# Patient Record
Sex: MALE | Race: WHITE | NOT HISPANIC OR LATINO | Employment: FULL TIME | RURAL
[De-identification: names, ages, dates, MRNs, and addresses within clinical notes are randomized per-mention and may not be internally consistent; named-entity substitution may affect disease eponyms.]

---

## 2019-10-07 LAB — CRC RECOMMENDATION EXT: NORMAL

## 2021-01-21 ENCOUNTER — HISTORICAL (OUTPATIENT)
Dept: ADMINISTRATIVE | Facility: HOSPITAL | Age: 66
End: 2021-01-21

## 2021-04-26 RX ORDER — CHOLECALCIFEROL (VITAMIN D3) 25 MCG
1000 TABLET ORAL DAILY
COMMUNITY

## 2021-04-26 RX ORDER — L. ACIDOPHILUS/L.BULGARICUS 100MM CELL
1 GRANULES IN PACKET (EA) ORAL DAILY
COMMUNITY

## 2021-04-26 RX ORDER — FLUTICASONE FUROATE AND VILANTEROL TRIFENATATE 100; 25 UG/1; UG/1
POWDER RESPIRATORY (INHALATION)
COMMUNITY
Start: 2021-04-16 | End: 2022-07-13

## 2021-04-26 RX ORDER — METFORMIN HYDROCHLORIDE 500 MG/1
1000 TABLET ORAL 2 TIMES DAILY WITH MEALS
Qty: 360 TABLET | Refills: 1 | Status: SHIPPED | OUTPATIENT
Start: 2021-04-26 | End: 2021-09-02 | Stop reason: SDUPTHER

## 2021-04-26 RX ORDER — ATORVASTATIN CALCIUM 40 MG/1
40 TABLET, FILM COATED ORAL DAILY
COMMUNITY
Start: 2021-04-07 | End: 2021-08-04 | Stop reason: SDUPTHER

## 2021-04-26 RX ORDER — TADALAFIL 5 MG/1
5 TABLET ORAL DAILY PRN
COMMUNITY
End: 2021-10-27 | Stop reason: SDUPTHER

## 2021-04-26 RX ORDER — CLORAZEPATE DIPOTASSIUM 3.75 MG/1
2 TABLET ORAL EVERY 12 HOURS PRN
COMMUNITY
Start: 2021-03-22 | End: 2021-08-04 | Stop reason: SDUPTHER

## 2021-04-26 RX ORDER — VITAMIN B COMPLEX
1 CAPSULE ORAL DAILY
COMMUNITY

## 2021-04-26 RX ORDER — METFORMIN HYDROCHLORIDE 500 MG/1
1000 TABLET ORAL 2 TIMES DAILY WITH MEALS
COMMUNITY
End: 2021-04-26 | Stop reason: SDUPTHER

## 2021-04-26 RX ORDER — ZINC GLUCONATE 50 MG
50 TABLET ORAL DAILY
COMMUNITY
End: 2022-03-10

## 2021-04-26 RX ORDER — MELATONIN 10 MG
10 CAPSULE ORAL NIGHTLY
COMMUNITY

## 2021-04-26 RX ORDER — LOSARTAN POTASSIUM 25 MG/1
12.5 TABLET ORAL DAILY
COMMUNITY
Start: 2021-02-01 | End: 2022-07-13 | Stop reason: SDUPTHER

## 2021-04-29 ENCOUNTER — LAB VISIT (OUTPATIENT)
Dept: LAB | Facility: CLINIC | Age: 66
End: 2021-04-29
Payer: MEDICARE

## 2021-04-29 ENCOUNTER — OFFICE VISIT (OUTPATIENT)
Dept: FAMILY MEDICINE | Facility: CLINIC | Age: 66
End: 2021-04-29
Payer: MEDICARE

## 2021-04-29 VITALS
DIASTOLIC BLOOD PRESSURE: 74 MMHG | HEIGHT: 69 IN | BODY MASS INDEX: 33.57 KG/M2 | WEIGHT: 226.63 LBS | SYSTOLIC BLOOD PRESSURE: 118 MMHG | RESPIRATION RATE: 20 BRPM | HEART RATE: 79 BPM | OXYGEN SATURATION: 97 % | TEMPERATURE: 98 F

## 2021-04-29 DIAGNOSIS — F41.9 ANXIETY: ICD-10-CM

## 2021-04-29 DIAGNOSIS — Z11.59 SCREENING FOR VIRAL DISEASE: ICD-10-CM

## 2021-04-29 DIAGNOSIS — Z86.16 PERSONAL HISTORY OF COVID-19: ICD-10-CM

## 2021-04-29 DIAGNOSIS — E11.9 TYPE 2 DIABETES MELLITUS WITHOUT COMPLICATION, WITHOUT LONG-TERM CURRENT USE OF INSULIN: ICD-10-CM

## 2021-04-29 DIAGNOSIS — E78.2 MIXED HYPERLIPIDEMIA: ICD-10-CM

## 2021-04-29 DIAGNOSIS — K42.9 UMBILICAL HERNIA WITHOUT OBSTRUCTION AND WITHOUT GANGRENE: ICD-10-CM

## 2021-04-29 DIAGNOSIS — Z79.899 ENCOUNTER FOR LONG-TERM (CURRENT) USE OF OTHER MEDICATIONS: ICD-10-CM

## 2021-04-29 DIAGNOSIS — K40.90 INGUINAL HERNIA, LEFT: ICD-10-CM

## 2021-04-29 DIAGNOSIS — G47.33 OSA ON CPAP: ICD-10-CM

## 2021-04-29 DIAGNOSIS — E11.9 TYPE 2 DIABETES MELLITUS WITHOUT COMPLICATION, WITHOUT LONG-TERM CURRENT USE OF INSULIN: Primary | ICD-10-CM

## 2021-04-29 PROBLEM — I10 ESSENTIAL HYPERTENSION: Status: ACTIVE | Noted: 2021-04-29

## 2021-04-29 LAB
ALBUMIN SERPL BCP-MCNC: 4 G/DL (ref 3.5–5)
ALBUMIN/GLOB SERPL: 1.3 {RATIO}
ALP SERPL-CCNC: 88 U/L (ref 45–115)
ALT SERPL W P-5'-P-CCNC: 34 U/L (ref 16–61)
ANION GAP SERPL CALCULATED.3IONS-SCNC: 12 MMOL/L (ref 7–16)
AST SERPL W P-5'-P-CCNC: 13 U/L (ref 15–37)
BASOPHILS # BLD AUTO: 0.02 K/UL (ref 0–0.2)
BASOPHILS NFR BLD AUTO: 0.3 % (ref 0–1)
BILIRUB SERPL-MCNC: 0.3 MG/DL (ref 0–1.2)
BUN SERPL-MCNC: 21 MG/DL (ref 7–18)
BUN/CREAT SERPL: 20 (ref 6–20)
CALCIUM SERPL-MCNC: 9.5 MG/DL (ref 8.5–10.1)
CHLORIDE SERPL-SCNC: 106 MMOL/L (ref 98–107)
CO2 SERPL-SCNC: 29 MMOL/L (ref 21–32)
CREAT SERPL-MCNC: 1.04 MG/DL (ref 0.7–1.3)
DIFFERENTIAL METHOD BLD: ABNORMAL
EOSINOPHIL # BLD AUTO: 0.08 K/UL (ref 0–0.5)
EOSINOPHIL NFR BLD AUTO: 1 % (ref 1–4)
ERYTHROCYTE [DISTWIDTH] IN BLOOD BY AUTOMATED COUNT: 13.2 % (ref 11.5–14.5)
EST. AVERAGE GLUCOSE BLD GHB EST-MCNC: 114 MG/DL
GLOBULIN SER-MCNC: 3.1 G/DL (ref 2–4)
GLUCOSE SERPL-MCNC: 112 MG/DL (ref 74–106)
HBA1C MFR BLD HPLC: 6 % (ref 4.5–6.6)
HCT VFR BLD AUTO: 45.5 % (ref 40–54)
HCV AB SER QL: NORMAL
HGB BLD-MCNC: 14.6 G/DL (ref 13.5–18)
IMM GRANULOCYTES # BLD AUTO: 0.02 K/UL (ref 0–0.04)
IMM GRANULOCYTES NFR BLD: 0.3 % (ref 0–0.4)
LYMPHOCYTES # BLD AUTO: 1.81 K/UL (ref 1–4.8)
LYMPHOCYTES NFR BLD AUTO: 23.5 % (ref 27–41)
MCH RBC QN AUTO: 29.9 PG (ref 27–31)
MCHC RBC AUTO-ENTMCNC: 32.1 G/DL (ref 32–36)
MCV RBC AUTO: 93 FL (ref 80–96)
MONOCYTES # BLD AUTO: 0.58 K/UL (ref 0–0.8)
MONOCYTES NFR BLD AUTO: 7.5 % (ref 2–6)
MPC BLD CALC-MCNC: 10.3 FL (ref 9.4–12.4)
NEUTROPHILS # BLD AUTO: 5.2 K/UL (ref 1.8–7.7)
NEUTROPHILS NFR BLD AUTO: 67.4 % (ref 53–65)
NRBC # BLD AUTO: 0 X10E3/UL
NRBC, AUTO (.00): 0 %
PLATELET # BLD AUTO: 263 K/UL (ref 150–400)
POTASSIUM SERPL-SCNC: 4.1 MMOL/L (ref 3.5–5.1)
PROT SERPL-MCNC: 7.1 G/DL (ref 6.4–8.2)
RBC # BLD AUTO: 4.89 M/UL (ref 4.6–6.2)
SODIUM SERPL-SCNC: 143 MMOL/L (ref 136–145)
TSH SERPL DL<=0.005 MIU/L-ACNC: 2.28 UIU/ML (ref 0.36–3.74)
WBC # BLD AUTO: 7.71 K/UL (ref 4.5–11)

## 2021-04-29 PROCEDURE — 99214 OFFICE O/P EST MOD 30 MIN: CPT | Mod: ,,, | Performed by: NURSE PRACTITIONER

## 2021-04-29 PROCEDURE — 99214 PR OFFICE/OUTPT VISIT, EST, LEVL IV, 30-39 MIN: ICD-10-PCS | Mod: ,,, | Performed by: NURSE PRACTITIONER

## 2021-04-29 PROCEDURE — 85025 COMPLETE CBC W/AUTO DIFF WBC: CPT | Mod: ,,, | Performed by: CLINICAL MEDICAL LABORATORY

## 2021-04-29 PROCEDURE — 84443 ASSAY THYROID STIM HORMONE: CPT | Mod: ,,, | Performed by: CLINICAL MEDICAL LABORATORY

## 2021-04-29 PROCEDURE — 86769 ANTIBODY, SARS-COV-2: ICD-10-PCS | Mod: 90,CR,, | Performed by: CLINICAL MEDICAL LABORATORY

## 2021-04-29 PROCEDURE — 84443 TSH: ICD-10-PCS | Mod: ,,, | Performed by: CLINICAL MEDICAL LABORATORY

## 2021-04-29 PROCEDURE — 85025 CBC WITH DIFFERENTIAL: ICD-10-PCS | Mod: ,,, | Performed by: CLINICAL MEDICAL LABORATORY

## 2021-04-29 PROCEDURE — 36415 COLL VENOUS BLD VENIPUNCTURE: CPT

## 2021-04-29 PROCEDURE — 80053 COMPREHENSIVE METABOLIC PANEL: ICD-10-PCS | Mod: ,,, | Performed by: CLINICAL MEDICAL LABORATORY

## 2021-04-29 PROCEDURE — 86803 HEPATITIS C ANTIBODY: ICD-10-PCS | Mod: ,,, | Performed by: CLINICAL MEDICAL LABORATORY

## 2021-04-29 PROCEDURE — 86803 HEPATITIS C AB TEST: CPT | Mod: ,,, | Performed by: CLINICAL MEDICAL LABORATORY

## 2021-04-29 PROCEDURE — 80053 COMPREHEN METABOLIC PANEL: CPT | Mod: ,,, | Performed by: CLINICAL MEDICAL LABORATORY

## 2021-04-29 PROCEDURE — 83036 HEMOGLOBIN GLYCOSYLATED A1C: CPT | Mod: ,,, | Performed by: CLINICAL MEDICAL LABORATORY

## 2021-04-29 PROCEDURE — 83036 HEMOGLOBIN A1C: ICD-10-PCS | Mod: ,,, | Performed by: CLINICAL MEDICAL LABORATORY

## 2021-04-29 PROCEDURE — 86769 SARS-COV-2 COVID-19 ANTIBODY: CPT | Mod: 90,CR,, | Performed by: CLINICAL MEDICAL LABORATORY

## 2021-04-29 RX ORDER — CETIRIZINE HYDROCHLORIDE 10 MG/1
10 TABLET ORAL DAILY
COMMUNITY

## 2021-04-29 RX ORDER — ASPIRIN 81 MG/1
81 TABLET ORAL DAILY
COMMUNITY

## 2021-04-29 RX ORDER — FAMOTIDINE 10 MG/1
10 TABLET ORAL DAILY
COMMUNITY

## 2021-04-29 RX ORDER — DIPHENHYDRAMINE HCL 25 MG
25 CAPSULE ORAL NIGHTLY PRN
COMMUNITY

## 2021-05-02 LAB — MAYO GENERIC ORDERABLE RESULT: ABNORMAL

## 2021-05-11 ENCOUNTER — TELEPHONE (OUTPATIENT)
Dept: FAMILY MEDICINE | Facility: CLINIC | Age: 66
End: 2021-05-11

## 2021-08-04 RX ORDER — ATORVASTATIN CALCIUM 40 MG/1
40 TABLET, FILM COATED ORAL DAILY
Qty: 90 TABLET | Refills: 3 | Status: SHIPPED | OUTPATIENT
Start: 2021-08-04 | End: 2022-07-13 | Stop reason: SDUPTHER

## 2021-08-04 RX ORDER — OXYCODONE AND ACETAMINOPHEN 7.5; 325 MG/1; MG/1
TABLET ORAL
COMMUNITY
Start: 2021-06-15 | End: 2021-10-27

## 2021-08-04 RX ORDER — CLORAZEPATE DIPOTASSIUM 3.75 MG/1
7.5 TABLET ORAL EVERY 12 HOURS PRN
Qty: 90 TABLET | Refills: 2 | Status: SHIPPED | OUTPATIENT
Start: 2021-08-04 | End: 2021-11-04 | Stop reason: SDUPTHER

## 2021-09-02 RX ORDER — METFORMIN HYDROCHLORIDE 500 MG/1
1000 TABLET ORAL 2 TIMES DAILY WITH MEALS
Qty: 360 TABLET | Refills: 1 | Status: SHIPPED | OUTPATIENT
Start: 2021-09-02 | End: 2022-03-07 | Stop reason: SDUPTHER

## 2021-10-26 PROBLEM — D17.9 LIPOMA: Status: ACTIVE | Noted: 2021-10-26

## 2021-10-27 ENCOUNTER — OFFICE VISIT (OUTPATIENT)
Dept: FAMILY MEDICINE | Facility: CLINIC | Age: 66
End: 2021-10-27
Payer: MEDICARE

## 2021-10-27 VITALS
OXYGEN SATURATION: 96 % | HEART RATE: 67 BPM | BODY MASS INDEX: 33.97 KG/M2 | RESPIRATION RATE: 20 BRPM | HEIGHT: 69 IN | WEIGHT: 229.38 LBS | DIASTOLIC BLOOD PRESSURE: 62 MMHG | TEMPERATURE: 98 F | SYSTOLIC BLOOD PRESSURE: 118 MMHG

## 2021-10-27 DIAGNOSIS — F41.9 ANXIETY: ICD-10-CM

## 2021-10-27 DIAGNOSIS — R22.42 LOCALIZED SWELLING, MASS, OR LUMP OF LEFT LOWER EXTREMITY: Primary | ICD-10-CM

## 2021-10-27 DIAGNOSIS — Z12.5 PROSTATE CANCER SCREENING: ICD-10-CM

## 2021-10-27 DIAGNOSIS — Z79.899 ENCOUNTER FOR LONG-TERM (CURRENT) USE OF OTHER MEDICATIONS: ICD-10-CM

## 2021-10-27 DIAGNOSIS — E78.2 MIXED HYPERLIPIDEMIA: ICD-10-CM

## 2021-10-27 DIAGNOSIS — Z86.16 PERSONAL HISTORY OF COVID-19: ICD-10-CM

## 2021-10-27 DIAGNOSIS — E11.9 TYPE 2 DIABETES MELLITUS WITHOUT COMPLICATION, WITHOUT LONG-TERM CURRENT USE OF INSULIN: ICD-10-CM

## 2021-10-27 PROBLEM — R22.40 LOCALIZED SWELLING, MASS AND LUMP, LOWER LIMB: Status: ACTIVE | Noted: 2021-10-27

## 2021-10-27 PROBLEM — D17.9 LIPOMA: Status: RESOLVED | Noted: 2021-10-26 | Resolved: 2021-10-27

## 2021-10-27 PROBLEM — K42.9 UMBILICAL HERNIA WITHOUT OBSTRUCTION AND WITHOUT GANGRENE: Status: RESOLVED | Noted: 2021-04-29 | Resolved: 2021-10-27

## 2021-10-27 LAB
ALBUMIN SERPL BCP-MCNC: 3.7 G/DL (ref 3.5–5)
ALBUMIN/GLOB SERPL: 1.1 {RATIO}
ALP SERPL-CCNC: 91 U/L (ref 45–115)
ALT SERPL W P-5'-P-CCNC: 40 U/L (ref 16–61)
AMPHET UR QL SCN: NEGATIVE
ANION GAP SERPL CALCULATED.3IONS-SCNC: 11 MMOL/L (ref 7–16)
AST SERPL W P-5'-P-CCNC: 19 U/L (ref 15–37)
BARBITURATES UR QL SCN: NEGATIVE
BASOPHILS # BLD AUTO: 0.02 K/UL (ref 0–0.2)
BASOPHILS NFR BLD AUTO: 0.4 % (ref 0–1)
BENZODIAZ METAB UR QL SCN: POSITIVE
BILIRUB SERPL-MCNC: 0.4 MG/DL (ref 0–1.2)
BUN SERPL-MCNC: 18 MG/DL (ref 7–18)
BUN/CREAT SERPL: 15 (ref 6–20)
CALCIUM SERPL-MCNC: 9.2 MG/DL (ref 8.5–10.1)
CANNABINOIDS UR QL SCN: NEGATIVE
CHLORIDE SERPL-SCNC: 107 MMOL/L (ref 98–107)
CHOLEST SERPL-MCNC: 102 MG/DL (ref 0–200)
CHOLEST/HDLC SERPL: 2.8 {RATIO}
CO2 SERPL-SCNC: 27 MMOL/L (ref 21–32)
COCAINE UR QL SCN: NEGATIVE
CREAT SERPL-MCNC: 1.19 MG/DL (ref 0.7–1.3)
DIFFERENTIAL METHOD BLD: ABNORMAL
EOSINOPHIL # BLD AUTO: 0.11 K/UL (ref 0–0.5)
EOSINOPHIL NFR BLD AUTO: 2 % (ref 1–4)
ERYTHROCYTE [DISTWIDTH] IN BLOOD BY AUTOMATED COUNT: 13.5 % (ref 11.5–14.5)
EST. AVERAGE GLUCOSE BLD GHB EST-MCNC: 120 MG/DL
GLOBULIN SER-MCNC: 3.4 G/DL (ref 2–4)
GLUCOSE SERPL-MCNC: 116 MG/DL (ref 74–106)
HBA1C MFR BLD HPLC: 6.2 % (ref 4.5–6.6)
HCT VFR BLD AUTO: 43.5 % (ref 40–54)
HDLC SERPL-MCNC: 36 MG/DL (ref 40–60)
HGB BLD-MCNC: 14.2 G/DL (ref 13.5–18)
IMM GRANULOCYTES # BLD AUTO: 0.01 K/UL (ref 0–0.04)
IMM GRANULOCYTES NFR BLD: 0.2 % (ref 0–0.4)
LDLC SERPL CALC-MCNC: 44 MG/DL
LDLC/HDLC SERPL: 1.2 {RATIO}
LYMPHOCYTES # BLD AUTO: 1.49 K/UL (ref 1–4.8)
LYMPHOCYTES NFR BLD AUTO: 26.5 % (ref 27–41)
MCH RBC QN AUTO: 30.3 PG (ref 27–31)
MCHC RBC AUTO-ENTMCNC: 32.6 G/DL (ref 32–36)
MCV RBC AUTO: 92.8 FL (ref 80–96)
MONOCYTES # BLD AUTO: 0.46 K/UL (ref 0–0.8)
MONOCYTES NFR BLD AUTO: 8.2 % (ref 2–6)
MPC BLD CALC-MCNC: 10.5 FL (ref 9.4–12.4)
NEUTROPHILS # BLD AUTO: 3.53 K/UL (ref 1.8–7.7)
NEUTROPHILS NFR BLD AUTO: 62.7 % (ref 53–65)
NONHDLC SERPL-MCNC: 66 MG/DL
NRBC # BLD AUTO: 0 X10E3/UL
NRBC, AUTO (.00): 0 %
OPIATES UR QL SCN: NEGATIVE
PCP UR QL SCN: NEGATIVE
PLATELET # BLD AUTO: 213 K/UL (ref 150–400)
POTASSIUM SERPL-SCNC: 3.8 MMOL/L (ref 3.5–5.1)
PROT SERPL-MCNC: 7.1 G/DL (ref 6.4–8.2)
PSA SERPL-MCNC: 0.74 NG/ML (ref 0–4.1)
RBC # BLD AUTO: 4.69 M/UL (ref 4.6–6.2)
SODIUM SERPL-SCNC: 141 MMOL/L (ref 136–145)
TRIGL SERPL-MCNC: 110 MG/DL (ref 35–150)
VLDLC SERPL-MCNC: 22 MG/DL
WBC # BLD AUTO: 5.62 K/UL (ref 4.5–11)

## 2021-10-27 PROCEDURE — 80307 DRUG SCREEN, URINE (BEAKER): ICD-10-PCS | Mod: ,,, | Performed by: CLINICAL MEDICAL LABORATORY

## 2021-10-27 PROCEDURE — G0103 PSA SCREENING: HCPCS | Mod: ,,, | Performed by: CLINICAL MEDICAL LABORATORY

## 2021-10-27 PROCEDURE — 85025 COMPLETE CBC W/AUTO DIFF WBC: CPT | Mod: ,,, | Performed by: CLINICAL MEDICAL LABORATORY

## 2021-10-27 PROCEDURE — 80307 DRUG TEST PRSMV CHEM ANLYZR: CPT | Mod: ,,, | Performed by: CLINICAL MEDICAL LABORATORY

## 2021-10-27 PROCEDURE — 85025 CBC WITH DIFFERENTIAL: ICD-10-PCS | Mod: ,,, | Performed by: CLINICAL MEDICAL LABORATORY

## 2021-10-27 PROCEDURE — 80061 LIPID PANEL: CPT | Mod: ,,, | Performed by: CLINICAL MEDICAL LABORATORY

## 2021-10-27 PROCEDURE — 80053 COMPREHEN METABOLIC PANEL: CPT | Mod: ,,, | Performed by: CLINICAL MEDICAL LABORATORY

## 2021-10-27 PROCEDURE — 80053 COMPREHENSIVE METABOLIC PANEL: ICD-10-PCS | Mod: ,,, | Performed by: CLINICAL MEDICAL LABORATORY

## 2021-10-27 PROCEDURE — 80061 LIPID PANEL: ICD-10-PCS | Mod: ,,, | Performed by: CLINICAL MEDICAL LABORATORY

## 2021-10-27 PROCEDURE — 99213 OFFICE O/P EST LOW 20 MIN: CPT | Mod: ,,, | Performed by: NURSE PRACTITIONER

## 2021-10-27 PROCEDURE — G0103 PSA, SCREENING: ICD-10-PCS | Mod: ,,, | Performed by: CLINICAL MEDICAL LABORATORY

## 2021-10-27 PROCEDURE — 99213 PR OFFICE/OUTPT VISIT, EST, LEVL III, 20-29 MIN: ICD-10-PCS | Mod: ,,, | Performed by: NURSE PRACTITIONER

## 2021-10-27 PROCEDURE — 83036 HEMOGLOBIN A1C: ICD-10-PCS | Mod: ,,, | Performed by: CLINICAL MEDICAL LABORATORY

## 2021-10-27 PROCEDURE — 83036 HEMOGLOBIN GLYCOSYLATED A1C: CPT | Mod: ,,, | Performed by: CLINICAL MEDICAL LABORATORY

## 2021-10-27 RX ORDER — TADALAFIL 5 MG/1
5 TABLET ORAL DAILY
Qty: 90 TABLET | Refills: 3 | Status: SHIPPED | OUTPATIENT
Start: 2021-10-27 | End: 2022-03-21 | Stop reason: SDUPTHER

## 2021-11-04 DIAGNOSIS — F41.9 ANXIETY: Primary | ICD-10-CM

## 2021-11-04 RX ORDER — CLORAZEPATE DIPOTASSIUM 3.75 MG/1
7.5 TABLET ORAL EVERY 12 HOURS PRN
Qty: 90 TABLET | Refills: 2 | Status: SHIPPED | OUTPATIENT
Start: 2021-11-04 | End: 2022-02-10 | Stop reason: SDUPTHER

## 2021-11-16 ENCOUNTER — TELEPHONE (OUTPATIENT)
Dept: FAMILY MEDICINE | Facility: CLINIC | Age: 66
End: 2021-11-16
Payer: MEDICARE

## 2021-11-17 ENCOUNTER — CLINICAL SUPPORT (OUTPATIENT)
Dept: FAMILY MEDICINE | Facility: CLINIC | Age: 66
End: 2021-11-17
Payer: MEDICARE

## 2021-11-17 DIAGNOSIS — Z23 NEED FOR INFLUENZA VACCINATION: Primary | ICD-10-CM

## 2021-11-17 PROCEDURE — G0008 ADMIN INFLUENZA VIRUS VAC: HCPCS | Mod: ,,, | Performed by: NURSE PRACTITIONER

## 2021-11-17 PROCEDURE — 90662 IIV NO PRSV INCREASED AG IM: CPT | Mod: ,,, | Performed by: NURSE PRACTITIONER

## 2021-11-17 PROCEDURE — 90662 FLU VACCINE - QUADRIVALENT - HIGH DOSE (65+) PRESERVATIVE FREE IM: ICD-10-PCS | Mod: ,,, | Performed by: NURSE PRACTITIONER

## 2021-11-17 PROCEDURE — G0008 FLU VACCINE - QUADRIVALENT - HIGH DOSE (65+) PRESERVATIVE FREE IM: ICD-10-PCS | Mod: ,,, | Performed by: NURSE PRACTITIONER

## 2022-02-10 DIAGNOSIS — F41.9 ANXIETY: ICD-10-CM

## 2022-02-10 RX ORDER — CLORAZEPATE DIPOTASSIUM 3.75 MG/1
7.5 TABLET ORAL EVERY 12 HOURS PRN
Qty: 90 TABLET | Refills: 0 | Status: SHIPPED | OUTPATIENT
Start: 2022-02-10 | End: 2022-03-10 | Stop reason: SDUPTHER

## 2022-02-10 NOTE — TELEPHONE ENCOUNTER
----- Message from Ariela Boone sent at 2/10/2022  8:37 AM CST -----  Patient needs a refill on tranzine called into [place] [id number] pharmacy.  Please call patient at [phone] if you have any questions. Thanks!

## 2022-03-07 RX ORDER — METFORMIN HYDROCHLORIDE 500 MG/1
1000 TABLET ORAL 2 TIMES DAILY WITH MEALS
Qty: 360 TABLET | Refills: 1 | Status: SHIPPED | OUTPATIENT
Start: 2022-03-07 | End: 2022-07-13 | Stop reason: SDUPTHER

## 2022-03-07 NOTE — TELEPHONE ENCOUNTER
----- Message from Ariela Boone sent at 3/7/2022 10:17 AM CST -----  Patient needs a refill on metformin [facility] .  Please call patient at [phone] if you have any questions. Thanks!

## 2022-03-10 ENCOUNTER — OFFICE VISIT (OUTPATIENT)
Dept: FAMILY MEDICINE | Facility: CLINIC | Age: 67
End: 2022-03-10
Payer: MEDICARE

## 2022-03-10 VITALS
WEIGHT: 234 LBS | DIASTOLIC BLOOD PRESSURE: 72 MMHG | RESPIRATION RATE: 20 BRPM | OXYGEN SATURATION: 97 % | SYSTOLIC BLOOD PRESSURE: 114 MMHG | HEIGHT: 69 IN | TEMPERATURE: 97 F | BODY MASS INDEX: 34.66 KG/M2 | HEART RATE: 71 BPM

## 2022-03-10 DIAGNOSIS — Z79.899 ENCOUNTER FOR LONG-TERM (CURRENT) USE OF OTHER MEDICATIONS: Primary | ICD-10-CM

## 2022-03-10 DIAGNOSIS — F41.9 ANXIETY: ICD-10-CM

## 2022-03-10 LAB
AMPHET UR QL SCN: NEGATIVE
BARBITURATES UR QL SCN: NEGATIVE
BENZODIAZ METAB UR QL SCN: POSITIVE
CANNABINOIDS UR QL SCN: NEGATIVE
COCAINE UR QL SCN: NEGATIVE
OPIATES UR QL SCN: NEGATIVE
PCP UR QL SCN: NEGATIVE

## 2022-03-10 PROCEDURE — 99213 PR OFFICE/OUTPT VISIT, EST, LEVL III, 20-29 MIN: ICD-10-PCS | Mod: ,,, | Performed by: NURSE PRACTITIONER

## 2022-03-10 PROCEDURE — 80307 DRUG TEST PRSMV CHEM ANLYZR: CPT | Mod: ,,, | Performed by: CLINICAL MEDICAL LABORATORY

## 2022-03-10 PROCEDURE — 99213 OFFICE O/P EST LOW 20 MIN: CPT | Mod: ,,, | Performed by: NURSE PRACTITIONER

## 2022-03-10 PROCEDURE — 80307 DRUG SCREEN, URINE (BEAKER): ICD-10-PCS | Mod: ,,, | Performed by: CLINICAL MEDICAL LABORATORY

## 2022-03-10 RX ORDER — CLORAZEPATE DIPOTASSIUM 3.75 MG/1
7.5 TABLET ORAL EVERY 12 HOURS PRN
Qty: 90 TABLET | Refills: 0 | Status: SHIPPED | OUTPATIENT
Start: 2022-03-10 | End: 2022-05-13 | Stop reason: SDUPTHER

## 2022-03-10 NOTE — PROGRESS NOTES
Subjective:       Patient ID: Jesse Winston is a 66 y.o. male.    Chief Complaint: Follow-up and Anxiety (Pt presents for 6 mth follow up for med refills.)    Pt presents for med refills. Pt is unable to see his PCP today.     Review of Systems   Constitutional: Negative for activity change, appetite change, fatigue and fever.   HENT: Negative for nasal congestion, nosebleeds, postnasal drip, rhinorrhea, sinus pressure/congestion, sneezing and sore throat.    Eyes: Negative for pain and itching.   Respiratory: Negative for cough, chest tightness, shortness of breath, wheezing and stridor.    Cardiovascular: Negative for chest pain.   Gastrointestinal: Negative for abdominal pain.   Genitourinary: Negative for dysuria.   Musculoskeletal: Negative for back pain.   Neurological: Negative for dizziness and headaches.   Psychiatric/Behavioral: Negative for behavioral problems and confusion.         Objective:      Physical Exam  Vitals and nursing note reviewed.   Constitutional:       Appearance: Normal appearance.   Cardiovascular:      Rate and Rhythm: Normal rate and regular rhythm.      Heart sounds: Normal heart sounds.   Pulmonary:      Effort: Pulmonary effort is normal.      Breath sounds: Normal breath sounds.   Musculoskeletal:         General: Normal range of motion.   Neurological:      Mental Status: He is alert and oriented to person, place, and time.   Psychiatric:         Behavior: Behavior normal.         Assessment:       Problem List Items Addressed This Visit        Psychiatric    Anxiety    Relevant Medications    clorazepate (TRANXENE) 3.75 MG Tab      Other Visit Diagnoses     Encounter for long-term (current) use of other medications    -  Primary    Relevant Orders    Drug Screen, Urine          Plan:        reviewed and attached to chart. UA drug screen completed.

## 2022-03-11 DIAGNOSIS — Z71.89 COMPLEX CARE COORDINATION: ICD-10-CM

## 2022-03-21 RX ORDER — TADALAFIL 5 MG/1
5 TABLET ORAL DAILY
Qty: 90 TABLET | Refills: 3 | Status: SHIPPED | OUTPATIENT
Start: 2022-03-21 | End: 2023-01-31 | Stop reason: SDUPTHER

## 2022-03-21 NOTE — TELEPHONE ENCOUNTER
----- Message from Ariela Boone sent at 3/21/2022  9:03 AM CDT -----  Patient needs a refill on tadalafil [facility].  Please call patient at [phone] if you have any questions. Thanks!

## 2022-05-13 DIAGNOSIS — F41.9 ANXIETY: ICD-10-CM

## 2022-05-13 NOTE — TELEPHONE ENCOUNTER
----- Message from Val Villanueva sent at 5/13/2022  9:39 AM CDT -----  Pt needs refill on tran[facility]ene [facility] to [facility]. [phone]

## 2022-05-16 RX ORDER — CLORAZEPATE DIPOTASSIUM 3.75 MG/1
7.5 TABLET ORAL EVERY 12 HOURS PRN
Qty: 90 TABLET | Refills: 0 | Status: SHIPPED | OUTPATIENT
Start: 2022-05-16 | End: 2022-07-13 | Stop reason: SDUPTHER

## 2022-07-13 ENCOUNTER — OFFICE VISIT (OUTPATIENT)
Dept: FAMILY MEDICINE | Facility: CLINIC | Age: 67
End: 2022-07-13
Payer: MEDICARE

## 2022-07-13 VITALS
SYSTOLIC BLOOD PRESSURE: 122 MMHG | HEIGHT: 69 IN | HEART RATE: 84 BPM | OXYGEN SATURATION: 97 % | DIASTOLIC BLOOD PRESSURE: 84 MMHG | BODY MASS INDEX: 35.16 KG/M2 | RESPIRATION RATE: 20 BRPM | TEMPERATURE: 98 F | WEIGHT: 237.38 LBS

## 2022-07-13 DIAGNOSIS — Z79.899 ENCOUNTER FOR LONG-TERM (CURRENT) USE OF OTHER MEDICATIONS: ICD-10-CM

## 2022-07-13 DIAGNOSIS — F41.9 ANXIETY: Primary | Chronic | ICD-10-CM

## 2022-07-13 DIAGNOSIS — J45.20 MILD INTERMITTENT ASTHMA WITHOUT COMPLICATION: Chronic | ICD-10-CM

## 2022-07-13 DIAGNOSIS — E11.9 TYPE 2 DIABETES MELLITUS WITHOUT COMPLICATION, WITHOUT LONG-TERM CURRENT USE OF INSULIN: Chronic | ICD-10-CM

## 2022-07-13 DIAGNOSIS — E78.2 MIXED HYPERLIPIDEMIA: Chronic | ICD-10-CM

## 2022-07-13 PROBLEM — K40.90 INGUINAL HERNIA, LEFT: Status: RESOLVED | Noted: 2021-04-29 | Resolved: 2022-07-13

## 2022-07-13 LAB
ALBUMIN SERPL BCP-MCNC: 3.7 G/DL (ref 3.5–5)
ALBUMIN/GLOB SERPL: 1.3 {RATIO}
ALP SERPL-CCNC: 88 U/L (ref 45–115)
ALT SERPL W P-5'-P-CCNC: 28 U/L (ref 16–61)
AMPHET UR QL SCN: NEGATIVE
ANION GAP SERPL CALCULATED.3IONS-SCNC: 10 MMOL/L (ref 7–16)
AST SERPL W P-5'-P-CCNC: 17 U/L (ref 15–37)
BARBITURATES UR QL SCN: NEGATIVE
BENZODIAZ METAB UR QL SCN: POSITIVE
BILIRUB SERPL-MCNC: 0.3 MG/DL (ref 0–1.2)
BUN SERPL-MCNC: 22 MG/DL (ref 7–18)
BUN/CREAT SERPL: 19 (ref 6–20)
CALCIUM SERPL-MCNC: 9.1 MG/DL (ref 8.5–10.1)
CANNABINOIDS UR QL SCN: NEGATIVE
CHLORIDE SERPL-SCNC: 106 MMOL/L (ref 98–107)
CHOLEST SERPL-MCNC: 109 MG/DL (ref 0–200)
CHOLEST/HDLC SERPL: 3.2 {RATIO}
CO2 SERPL-SCNC: 27 MMOL/L (ref 21–32)
COCAINE UR QL SCN: NEGATIVE
CREAT SERPL-MCNC: 1.17 MG/DL (ref 0.7–1.3)
CREAT UR-MCNC: 154 MG/DL (ref 39–259)
EST. AVERAGE GLUCOSE BLD GHB EST-MCNC: 124 MG/DL
GLOBULIN SER-MCNC: 2.8 G/DL (ref 2–4)
GLUCOSE SERPL-MCNC: 113 MG/DL (ref 74–106)
HBA1C MFR BLD HPLC: 6.3 % (ref 4.5–6.6)
HDLC SERPL-MCNC: 34 MG/DL (ref 40–60)
LDLC SERPL CALC-MCNC: 39 MG/DL
LDLC/HDLC SERPL: 1.1 {RATIO}
MICROALBUMIN UR-MCNC: 0.6 MG/DL (ref 0–2.8)
MICROALBUMIN/CREAT RATIO PNL UR: 3.9 MG/G (ref 0–30)
NONHDLC SERPL-MCNC: 75 MG/DL
OPIATES UR QL SCN: NEGATIVE
PCP UR QL SCN: NEGATIVE
POTASSIUM SERPL-SCNC: 4.6 MMOL/L (ref 3.5–5.1)
PROT SERPL-MCNC: 6.5 G/DL (ref 6.4–8.2)
SODIUM SERPL-SCNC: 138 MMOL/L (ref 136–145)
TRIGL SERPL-MCNC: 179 MG/DL (ref 35–150)
TSH SERPL DL<=0.005 MIU/L-ACNC: 2.06 UIU/ML (ref 0.36–3.74)
VLDLC SERPL-MCNC: 36 MG/DL

## 2022-07-13 PROCEDURE — 83036 HEMOGLOBIN A1C: ICD-10-PCS | Mod: ,,, | Performed by: CLINICAL MEDICAL LABORATORY

## 2022-07-13 PROCEDURE — 99214 OFFICE O/P EST MOD 30 MIN: CPT | Mod: ,,, | Performed by: NURSE PRACTITIONER

## 2022-07-13 PROCEDURE — 84443 ASSAY THYROID STIM HORMONE: CPT | Mod: ,,, | Performed by: CLINICAL MEDICAL LABORATORY

## 2022-07-13 PROCEDURE — 82043 UR ALBUMIN QUANTITATIVE: CPT | Mod: ,,, | Performed by: CLINICAL MEDICAL LABORATORY

## 2022-07-13 PROCEDURE — 80061 LIPID PANEL: CPT | Mod: ,,, | Performed by: CLINICAL MEDICAL LABORATORY

## 2022-07-13 PROCEDURE — 80307 DRUG SCREEN, URINE (BEAKER): ICD-10-PCS | Mod: ,,, | Performed by: CLINICAL MEDICAL LABORATORY

## 2022-07-13 PROCEDURE — 80053 COMPREHEN METABOLIC PANEL: CPT | Mod: ,,, | Performed by: CLINICAL MEDICAL LABORATORY

## 2022-07-13 PROCEDURE — 82570 ASSAY OF URINE CREATININE: CPT | Mod: 59,,, | Performed by: CLINICAL MEDICAL LABORATORY

## 2022-07-13 PROCEDURE — 83036 HEMOGLOBIN GLYCOSYLATED A1C: CPT | Mod: ,,, | Performed by: CLINICAL MEDICAL LABORATORY

## 2022-07-13 PROCEDURE — 99214 PR OFFICE/OUTPT VISIT, EST, LEVL IV, 30-39 MIN: ICD-10-PCS | Mod: ,,, | Performed by: NURSE PRACTITIONER

## 2022-07-13 PROCEDURE — 82570 MICROALBUMIN / CREATININE RATIO URINE: ICD-10-PCS | Mod: 59,,, | Performed by: CLINICAL MEDICAL LABORATORY

## 2022-07-13 PROCEDURE — 82043 MICROALBUMIN / CREATININE RATIO URINE: ICD-10-PCS | Mod: ,,, | Performed by: CLINICAL MEDICAL LABORATORY

## 2022-07-13 PROCEDURE — 80053 COMPREHENSIVE METABOLIC PANEL: ICD-10-PCS | Mod: ,,, | Performed by: CLINICAL MEDICAL LABORATORY

## 2022-07-13 PROCEDURE — 84443 TSH: ICD-10-PCS | Mod: ,,, | Performed by: CLINICAL MEDICAL LABORATORY

## 2022-07-13 PROCEDURE — 80061 LIPID PANEL: ICD-10-PCS | Mod: ,,, | Performed by: CLINICAL MEDICAL LABORATORY

## 2022-07-13 PROCEDURE — 80307 DRUG TEST PRSMV CHEM ANLYZR: CPT | Mod: ,,, | Performed by: CLINICAL MEDICAL LABORATORY

## 2022-07-13 RX ORDER — CLORAZEPATE DIPOTASSIUM 3.75 MG/1
7.5 TABLET ORAL EVERY 12 HOURS PRN
Qty: 90 TABLET | Refills: 2 | Status: SHIPPED | OUTPATIENT
Start: 2022-07-13 | End: 2022-10-20 | Stop reason: SDUPTHER

## 2022-07-13 RX ORDER — LOSARTAN POTASSIUM 25 MG/1
12.5 TABLET ORAL DAILY
Qty: 45 TABLET | Refills: 1 | Status: SHIPPED | OUTPATIENT
Start: 2022-07-13 | End: 2022-10-20 | Stop reason: SDUPTHER

## 2022-07-13 RX ORDER — ATORVASTATIN CALCIUM 40 MG/1
40 TABLET, FILM COATED ORAL DAILY
Qty: 90 TABLET | Refills: 3 | Status: SHIPPED | OUTPATIENT
Start: 2022-07-13 | End: 2023-08-30 | Stop reason: SDUPTHER

## 2022-07-13 RX ORDER — METFORMIN HYDROCHLORIDE 500 MG/1
1000 TABLET ORAL 2 TIMES DAILY WITH MEALS
Qty: 360 TABLET | Refills: 1 | Status: SHIPPED | OUTPATIENT
Start: 2022-07-13 | End: 2022-10-20 | Stop reason: SDUPTHER

## 2022-07-13 NOTE — PROGRESS NOTES
[facility] - FAMILY MEDICINE       PATIENT NAME: Jesse Winston   : 1955    AGE: 67 y.o. DATE: 2022    MRN: [id number]        Reason for Visit / Chief Complaint:  Follow-up (Pt presents for 3 month anxiety, HLD, and DM follow up. He is fasting.), Anxiety, Diabetes, and Hyperlipidemia     Subjective:     HPI:    Follow-up (Pt presents for 3 month anxiety, HLD, and DM follow up. He is fasting.), Anxiety, Diabetes, and Hyperlipidemia    Had 2 lipomas removed when he had hernia repair, getting 2 more to right arm.   No home glucose monitoring.    Review of Systems:     Review of Systems   Constitutional: Negative.    Respiratory: Negative.         On CPAP for SHEILA.   Cardiovascular: Negative.    Gastrointestinal: Negative.    Endocrine: Negative.    Genitourinary: Negative.    Neurological: Negative.    Psychiatric/Behavioral: Negative for sleep disturbance and suicidal ideas. Nervous/anxious: anxiety controlled with tranxene as needed for years.        Allergies:     Review of patient's allergies indicates:  No Known Allergies     Labs:      Lab Results   Component Value Date    HGBA1C 6.2 10/27/2021      Lipids:   Lab Results   Component Value Date    CHOL 102 10/27/2021     Lab Results   Component Value Date    HDL 36 (L) 10/27/2021     Lab Results   Component Value Date    LDLCALC 44 10/27/2021     Lab Results   Component Value Date    TRIG 110 10/27/2021     CMP:  Sodium   Date Value Ref Range Status   10/27/2021 141 136 - 145 mmol/L Final     Potassium   Date Value Ref Range Status   10/27/2021 3.8 3.5 - 5.1 mmol/L Final     Chloride   Date Value Ref Range Status   10/27/2021 107 98 - 107 mmol/L Final     Glucose   Date Value Ref Range Status   10/27/2021 116 (H) 74 - 106 mg/dL Final     BUN   Date Value Ref Range Status   10/27/2021 18 7 - 18 mg/dL Final     Creatinine   Date Value Ref Range Status   10/27/2021 1.19 0.70 - 1.30 mg/dL Final  "    AST   Date Value Ref Range Status   10/27/2021 19 15 - 37 U/L Final     ALT   Date Value Ref Range Status   10/27/2021 40 16 - 61 U/L Final     eGFR   Date Value Ref Range Status   10/27/2021 65 >=60 mL/min/1.73m² Final      CBC:  Lab Results   Component Value Date    WBC 5.62 10/27/2021    RBC 4.69 10/27/2021    HGB 14.2 10/27/2021    HCT 43.5 10/27/2021     10/27/2021      TSH:  Lab Results   Component Value Date    TSH 2.280 04/29/2021       Medical History:     Past Medical History:   Diagnosis Date    Anxiety     Diabetes mellitus, type 2     Hyperlipidemia     Hypertension     Inguinal hernia, left 4/29/2021    Lipoma 10/26/2021    Umbilical hernia without obstruction and without gangrene 4/29/2021      Social History     Tobacco Use   Smoking Status Never Smoker   Smokeless Tobacco Never Used      Past Surgical History:   Procedure Laterality Date    COLONOSCOPY  10/07/2019    HERNIA REPAIR      LIPOMA RESECTION      removal per Dr. Daniel    REPAIR OF EYELID Bilateral         Health Maintenance:     Health Maintenance Due   Topic Date Due    Shingles Vaccine (2 of 3) 09/09/2013    TETANUS VACCINE  08/12/2020    Hemoglobin A1c  04/27/2022    Diabetes Urine Screening  04/29/2022     Health Maintenance Topics with due status: Not Due       Topic Last Completion Date    Colorectal Cancer Screening 10/17/2019    PROSTATE-SPECIFIC ANTIGEN 10/27/2021    Lipid Panel 10/27/2021    Influenza Vaccine 11/17/2021    Eye Exam 12/28/2021    Low Dose Statin 07/13/2022    Foot Exam 07/13/2022       Objective:      Wt Readings from Last 3 Encounters:   07/13/22 0945 107.7 kg (237 lb 6.4 oz)   03/10/22 1028 106.1 kg (234 lb)   10/27/21 0743 104.1 kg (229 lb 6.4 oz)     Vitals:    07/13/22 0945   BP: 122/84   BP Location: Left arm   Patient Position: Sitting   BP Method: Large (Manual)   Pulse: 84   Resp: 20   Temp: 98.2 °F (36.8 °C)   TempSrc: Oral   SpO2: 97%   Weight: 107.7 kg (237 lb 6.4 oz)   Height: 5' 9" (1.753 " m)     Body mass index is 35.06 kg/m².     Physical Exam:    Physical Exam  Vitals and nursing note reviewed.   Constitutional:       General: He is not in acute distress.     Appearance: Normal appearance.   HENT:      Head: Normocephalic.   Eyes:      Conjunctiva/sclera: Conjunctivae normal.      Pupils: Pupils are equal, round, and reactive to light.   Neck:      Thyroid: No thyromegaly.      Vascular: No carotid bruit.      Trachea: Trachea normal.   Cardiovascular:      Rate and Rhythm: Normal rate and regular rhythm.      Pulses:           Dorsalis pedis pulses are 3+ on the right side and 3+ on the left side.        Posterior tibial pulses are 3+ on the right side and 3+ on the left side.      Heart sounds: Normal heart sounds.   Pulmonary:      Effort: Pulmonary effort is normal. No respiratory distress.      Breath sounds: Normal breath sounds.   Chest:   Breasts:      Right: No supraclavicular adenopathy.      Left: No supraclavicular adenopathy.       Musculoskeletal:      Cervical back: Neck supple.      Right lower leg: No edema.      Left lower leg: No edema.      Right foot: Normal range of motion. No deformity or bunion.      Left foot: Normal range of motion. No deformity or bunion.   Feet:      Right foot:      Protective Sensation: 10 sites tested. 10 sites sensed.      Skin integrity: Skin integrity normal. No ulcer, blister, erythema, warmth or callus.      Toenail Condition: Right toenails are normal.      Left foot:      Protective Sensation: 10 sites tested. 10 sites sensed.      Skin integrity: Skin integrity normal. No ulcer, blister, erythema, warmth or callus.      Toenail Condition: Left toenails are normal.   Lymphadenopathy:      Cervical: No cervical adenopathy.      Upper Body:      Right upper body: No supraclavicular adenopathy.      Left upper body: No supraclavicular adenopathy.   Skin:     General: Skin is warm and dry.      Findings: No rash.   Neurological:      General: No  focal deficit present.      Mental Status: He is alert and oriented to person, place, and time.   Psychiatric:         Mood and Affect: Mood normal.         Behavior: Behavior normal.          Assessment:          ICD-10-CM ICD-9-CM   1. Anxiety  F41.9 300.00   2. Mild intermittent asthma without complication  J45.20 493.90   3. Type 2 diabetes mellitus without complication, without long-term current use of insulin  E11.9 250.00   4. Mixed hyperlipidemia  E78.2 272.2   5. Encounter for long-term (current) use of other medications  Z79.899 V58.69        Plan:       Anxiety  -     Drug Screen, Urine; Future; Expected date: 07/13/2022  -     clorazepate (TRANXENE) 3.75 MG Tab; Take 2 tablets (7.5 mg total) by mouth every 12 (twelve) hours as needed (anxiety). 1-2 tablets by mouth every 12 hours as needed for anxiety  Dispense: 90 tablet; Refill: 2    Mild intermittent asthma without complication  Comments:  no problems since having COVID    Type 2 diabetes mellitus without complication, without long-term current use of insulin  -     Hemoglobin A1C; Future; Expected date: 07/13/2022  -     Microalbumin/Creatinine Ratio, Urine; Future; Expected date: 07/13/2022  -     Comprehensive Metabolic Panel; Future; Expected date: 07/13/2022    Mixed hyperlipidemia  -     Lipid Panel; Future; Expected date: 07/13/2022  -     Comprehensive Metabolic Panel; Future; Expected date: 07/13/2022  -     TSH; Future; Expected date: 07/13/2022    Encounter for long-term (current) use of other medications  -     Drug Screen, Urine; Future; Expected date: 07/13/2022  -     Comprehensive Metabolic Panel; Future; Expected date: 07/13/2022  -     TSH; Future; Expected date: 07/13/2022    Other orders  -     atorvastatin (LIPITOR) 40 MG tablet; Take 1 tablet (40 mg total) by mouth once daily.  Dispense: 90 tablet; Refill: 3  -     metFORMIN (GLUCOPHAGE) 500 MG tablet; Take 2 tablets (1,000 mg total) by mouth 2 (two) times daily with meals.   Dispense: 360 tablet; Refill: 1  -     losartan (COZAAR) 25 MG tablet; Take 0.5 tablets (12.5 mg total) by mouth once daily.  Dispense: 45 tablet; Refill: 1        Current Outpatient Medications:     aspirin (ECOTRIN) 81 MG EC tablet, Take 81 mg by mouth once daily., Disp: , Rfl:     b complex vitamins capsule, Take 1 capsule by mouth once daily., Disp: , Rfl:     cetirizine (ZYRTEC) 10 MG tablet, Take 10 mg by mouth once daily., Disp: , Rfl:     diphenhydrAMINE (BENADRYL) 25 mg capsule, Take 25 mg by mouth nightly as needed for Itching., Disp: , Rfl:     famotidine (PEPCID) 10 MG tablet, Take 10 mg by mouth 2 (two) times daily., Disp: , Rfl:     folic acid/multivit-min/lutein (CENTRUM SILVER ORAL), Take 1 capsule by mouth once daily., Disp: , Rfl:     lactobacillus acidophilus & bulgar (LACTINEX) 100 million cell packet, Take 1 tablet by mouth every evening., Disp: , Rfl:     melatonin 10 mg Cap, Take 10 mg by mouth every evening., Disp: , Rfl:     tadalafiL (CIALIS) 5 MG tablet, Take 1 tablet (5 mg total) by mouth once daily., Disp: 90 tablet, Rfl: 3    vitamin D (VITAMIN D3) 1000 units Tab, Take 1,000 Units by mouth once daily., Disp: , Rfl:     atorvastatin (LIPITOR) 40 MG tablet, Take 1 tablet (40 mg total) by mouth once daily., Disp: 90 tablet, Rfl: 3    clorazepate (TRANXENE) 3.75 MG Tab, Take 2 tablets (7.5 mg total) by mouth every 12 (twelve) hours as needed (anxiety). 1-2 tablets by mouth every 12 hours as needed for anxiety, Disp: 90 tablet, Rfl: 2    losartan (COZAAR) 25 MG tablet, Take 0.5 tablets (12.5 mg total) by mouth once daily., Disp: 45 tablet, Rfl: 1    metFORMIN (GLUCOPHAGE) 500 MG tablet, Take 2 tablets (1,000 mg total) by mouth 2 (two) times daily with meals., Disp: 360 tablet, Rfl: 1  Active Problem List with Overview Notes    Diagnosis Date Noted    Mild intermittent asthma without complication      Per Pulmolonologist Dr. Katz's notes. Mild RAD noted on PFTs.      Localized swelling,  mass and lump, lower limb 10/27/2021    Type 2 diabetes mellitus without complication, without long-term current use of insulin 04/29/2021    Mixed hyperlipidemia 04/29/2021    Anxiety 04/29/2021    SHEILA on CPAP 04/29/2021    Personal history of COVID-19 04/29/2021       New & refilled meds:  Requested Prescriptions     Signed Prescriptions Disp Refills    atorvastatin (LIPITOR) 40 MG tablet 90 tablet 3     Sig: Take 1 tablet (40 mg total) by mouth once daily.    clorazepate (TRANXENE) 3.75 MG Tab 90 tablet 2     Sig: Take 2 tablets (7.5 mg total) by mouth every 12 (twelve) hours as needed (anxiety). 1-2 tablets by mouth every 12 hours as needed for anxiety    metFORMIN (GLUCOPHAGE) 500 MG tablet 360 tablet 1     Sig: Take 2 tablets (1,000 mg total) by mouth 2 (two) times daily with meals.    losartan (COZAAR) 25 MG tablet 45 tablet 1     Sig: Take 0.5 tablets (12.5 mg total) by mouth once daily.     Controlled substance agreement dated 2/28/21 scanned into chart.   reviewed with no suspicious activity noted  Random urine drug screen.    Rx changes: none    current treatment plan is effective, no change in therapy, lab results reviewed with patient, reviewed diet, exercise and weight control    Return to clinic every 3 mths for anxiety/benzo monitoring; and f/u as needed.    Future Appointments   Date Time Provider Department Center   9/15/2022  8:00 AM PROMISE Alonso [facility]   10/20/2022  9:00 AM PROMISE Aolnso [facility]        Signature[facility]  [facility]    Date of encounter: 7/13/22  Reviewed 9/9/22 by BLAS Cervantes MD

## 2022-10-09 DIAGNOSIS — Z71.89 COMPLEX CARE COORDINATION: ICD-10-CM

## 2022-10-20 ENCOUNTER — OFFICE VISIT (OUTPATIENT)
Dept: FAMILY MEDICINE | Facility: CLINIC | Age: 67
End: 2022-10-20
Payer: MEDICARE

## 2022-10-20 ENCOUNTER — TELEPHONE (OUTPATIENT)
Dept: FAMILY MEDICINE | Facility: CLINIC | Age: 67
End: 2022-10-20
Payer: MEDICARE

## 2022-10-20 VITALS
BODY MASS INDEX: 34.75 KG/M2 | SYSTOLIC BLOOD PRESSURE: 120 MMHG | WEIGHT: 234.63 LBS | TEMPERATURE: 98 F | HEIGHT: 69 IN | HEART RATE: 60 BPM | DIASTOLIC BLOOD PRESSURE: 84 MMHG | OXYGEN SATURATION: 97 % | RESPIRATION RATE: 18 BRPM

## 2022-10-20 DIAGNOSIS — E66.9 OBESITY (BMI 30-39.9): Chronic | ICD-10-CM

## 2022-10-20 DIAGNOSIS — F41.9 ANXIETY: Primary | Chronic | ICD-10-CM

## 2022-10-20 DIAGNOSIS — Z79.899 ENCOUNTER FOR LONG-TERM (CURRENT) USE OF OTHER MEDICATIONS: ICD-10-CM

## 2022-10-20 DIAGNOSIS — E11.9 TYPE 2 DIABETES MELLITUS WITHOUT COMPLICATION, WITHOUT LONG-TERM CURRENT USE OF INSULIN: Chronic | ICD-10-CM

## 2022-10-20 DIAGNOSIS — Z23 FLU VACCINE NEED: ICD-10-CM

## 2022-10-20 PROBLEM — R22.40 LOCALIZED SWELLING, MASS AND LUMP, LOWER LIMB: Status: RESOLVED | Noted: 2021-10-27 | Resolved: 2022-10-20

## 2022-10-20 PROCEDURE — 80307 DRUG SCREEN, URINE (BEAKER): ICD-10-PCS | Mod: ,,, | Performed by: CLINICAL MEDICAL LABORATORY

## 2022-10-20 PROCEDURE — 99214 PR OFFICE/OUTPT VISIT, EST, LEVL IV, 30-39 MIN: ICD-10-PCS | Mod: ,,, | Performed by: NURSE PRACTITIONER

## 2022-10-20 PROCEDURE — 80307 DRUG TEST PRSMV CHEM ANLYZR: CPT | Mod: ,,, | Performed by: CLINICAL MEDICAL LABORATORY

## 2022-10-20 PROCEDURE — 90694 VACC AIIV4 NO PRSRV 0.5ML IM: CPT | Mod: ,,, | Performed by: NURSE PRACTITIONER

## 2022-10-20 PROCEDURE — G0008 ADMIN INFLUENZA VIRUS VAC: HCPCS | Mod: ,,, | Performed by: NURSE PRACTITIONER

## 2022-10-20 PROCEDURE — 90694 FLU VACCINE - QUADRIVALENT - ADJUVANTED: ICD-10-PCS | Mod: ,,, | Performed by: NURSE PRACTITIONER

## 2022-10-20 PROCEDURE — 99214 OFFICE O/P EST MOD 30 MIN: CPT | Mod: ,,, | Performed by: NURSE PRACTITIONER

## 2022-10-20 PROCEDURE — G0008 FLU VACCINE - QUADRIVALENT - ADJUVANTED: ICD-10-PCS | Mod: ,,, | Performed by: NURSE PRACTITIONER

## 2022-10-20 RX ORDER — LOSARTAN POTASSIUM 25 MG/1
12.5 TABLET ORAL DAILY
Qty: 45 TABLET | Refills: 1 | Status: SHIPPED | OUTPATIENT
Start: 2022-10-20 | End: 2023-01-31 | Stop reason: SDUPTHER

## 2022-10-20 RX ORDER — CLORAZEPATE DIPOTASSIUM 3.75 MG/1
7.5 TABLET ORAL EVERY 12 HOURS PRN
Qty: 90 TABLET | Refills: 2 | Status: SHIPPED | OUTPATIENT
Start: 2022-10-20 | End: 2023-01-31 | Stop reason: SDUPTHER

## 2022-10-20 RX ORDER — METFORMIN HYDROCHLORIDE 500 MG/1
1000 TABLET ORAL 2 TIMES DAILY WITH MEALS
Qty: 360 TABLET | Refills: 1 | Status: SHIPPED | OUTPATIENT
Start: 2022-10-20 | End: 2023-01-31 | Stop reason: SDUPTHER

## 2022-10-20 NOTE — PROGRESS NOTES
[facility] - FAMILY MEDICINE       PATIENT NAME: Jesse Winston   : 1955    AGE: 67 y.o. DATE OF ENCOUNTER: 10/20/22    MRN: [id number]        Reason for Visit / Chief Complaint:  Anxiety, Hyperlipidemia, Hypertension, and Follow-up (3mo fu)     Subjective:     HPI:    Presents for 3 mth f/u anxiety & benzo monitoring w/ med refill.  On low dose tranxene many years, well controlled.  PMH T2DM & HLD well controlled w/ current tx    Going to [facility] end of next month and he & his wife will need meds in case of illness.  Advised will let him know when sent.    Review of Systems:     Review of Systems   Constitutional: Negative.    Respiratory: Negative.          On CPAP for SHEILA.   Cardiovascular: Negative.    Gastrointestinal: Negative.    Endocrine: Negative.    Genitourinary: Negative.    Neurological: Negative.    Psychiatric/Behavioral:  Negative for sleep disturbance and suicidal ideas. Nervous/anxious: anxiety controlled with tranxene as needed for years.      Allergies:     Review of patient's allergies indicates:  No Known Allergies     Labs:      Lab Results   Component Value Date    HGBA1C 6.3 2022      Lipids:   Lab Results   Component Value Date    CHOL 109 2022     Lab Results   Component Value Date    HDL 34 (L) 2022     Lab Results   Component Value Date    LDLCALC 39 2022     Lab Results   Component Value Date    TRIG 179 (H) 2022     CMP:  Sodium   Date Value Ref Range Status   2022 138 136 - 145 mmol/L Final     Potassium   Date Value Ref Range Status   2022 4.6 3.5 - 5.1 mmol/L Final     Chloride   Date Value Ref Range Status   2022 106 98 - 107 mmol/L Final     Glucose   Date Value Ref Range Status   2022 113 (H) 74 - 106 mg/dL Final     BUN   Date Value Ref Range Status   2022 22 (H) 7 - 18 mg/dL Final     Creatinine   Date Value Ref Range Status   2022 1.17 0.70 - 1.30 mg/dL Final     AST   Date Value Ref  "Range Status   07/13/2022 17 15 - 37 U/L Final     ALT   Date Value Ref Range Status   07/13/2022 28 16 - 61 U/L Final     eGFR   Date Value Ref Range Status   07/13/2022 66 >=60 mL/min/1.73m² Final      CBC:  Lab Results   Component Value Date    WBC 5.62 10/27/2021    RBC 4.69 10/27/2021    HGB 14.2 10/27/2021    HCT 43.5 10/27/2021     10/27/2021      TSH:  Lab Results   Component Value Date    TSH 2.060 07/13/2022     Objective:      Vitals:    10/20/22 0912   BP: 120/84   BP Location: Left arm   Patient Position: Sitting   BP Method: Large (Manual)   Pulse: 60   Resp: 18   Temp: 97.7 °F (36.5 °C)   SpO2: 97%   Weight: 106.4 kg (234 lb 9.6 oz)   Height: 5' 9" (1.753 m)     Body mass index is 34.64 kg/m².     Physical Exam:    Physical Exam  Vitals and nursing note reviewed.   Constitutional:       General: He is not in acute distress.     Appearance: Normal appearance.   HENT:      Head: Normocephalic.   Eyes:      Conjunctiva/sclera: Conjunctivae normal.   Neck:      Thyroid: No thyromegaly or thyroid tenderness.      Trachea: Trachea normal.   Cardiovascular:      Rate and Rhythm: Normal rate and regular rhythm.      Pulses: Normal pulses.      Heart sounds: Normal heart sounds.   Pulmonary:      Effort: Pulmonary effort is normal. No respiratory distress.      Breath sounds: Normal breath sounds.   Musculoskeletal:      Cervical back: Neck supple.      Right lower leg: No edema.      Left lower leg: No edema.   Lymphadenopathy:      Cervical: No cervical adenopathy.   Skin:     General: Skin is warm and dry.   Neurological:      Mental Status: He is alert and oriented to person, place, and time.   Psychiatric:         Mood and Affect: Mood normal.         Behavior: Behavior normal.        Assessment:          ICD-10-CM ICD-9-CM   1. Anxiety  F41.9 300.00   2. Type 2 diabetes mellitus without complication, without long-term current use of insulin  E11.9 250.00   3. Obesity (BMI 30-39.9)  E66.9 278.00 "   4. Encounter for long-term (current) use of other medications  Z79.899 V58.69   5. Flu vaccine need  Z23 V04.81        Plan:       Anxiety  -     Drug Screen, Urine; Future; Expected date: 10/20/2022  -     clorazepate (TRANXENE) 3.75 MG Tab; Take 2 tablets (7.5 mg total) by mouth every 12 (twelve) hours as needed (anxiety). 1-2 tablets by mouth every 12 hours as needed for anxiety  Dispense: 90 tablet; Refill: 2    Type 2 diabetes mellitus without complication, without long-term current use of insulin  -     metFORMIN (GLUCOPHAGE) 500 MG tablet; Take 2 tablets (1,000 mg total) by mouth 2 (two) times daily with meals.  Dispense: 360 tablet; Refill: 1    Obesity (BMI 30-39.9)    Encounter for long-term (current) use of other medications  -     Drug Screen, Urine; Future; Expected date: 10/20/2022    Flu vaccine need  -     Influenza - Quadrivalent (Adjuvanted)    Other orders  -     losartan (COZAAR) 25 MG tablet; Take 0.5 tablets (12.5 mg total) by mouth once daily.  Dispense: 45 tablet; Refill: 1      Current Outpatient Medications:     aspirin (ECOTRIN) 81 MG EC tablet, Take 81 mg by mouth once daily., Disp: , Rfl:     atorvastatin (LIPITOR) 40 MG tablet, Take 1 tablet (40 mg total) by mouth once daily., Disp: 90 tablet, Rfl: 3    b complex vitamins capsule, Take 1 capsule by mouth once daily., Disp: , Rfl:     cetirizine (ZYRTEC) 10 MG tablet, Take 10 mg by mouth once daily., Disp: , Rfl:     diphenhydrAMINE (BENADRYL) 25 mg capsule, Take 25 mg by mouth nightly as needed for Itching., Disp: , Rfl:     famotidine (PEPCID) 10 MG tablet, Take 10 mg by mouth 2 (two) times daily., Disp: , Rfl:     folic acid/multivit-min/lutein (CENTRUM SILVER ORAL), Take 1 capsule by mouth once daily., Disp: , Rfl:     lactobacillus acidophilus & bulgar (LACTINEX) 100 million cell packet, Take 1 tablet by mouth every evening., Disp: , Rfl:     melatonin 10 mg Cap, Take 10 mg by mouth every evening., Disp: , Rfl:     tadalafiL  (CIALIS) 5 MG tablet, Take 1 tablet (5 mg total) by mouth once daily., Disp: 90 tablet, Rfl: 3    vitamin D (VITAMIN D3) 1000 units Tab, Take 1,000 Units by mouth once daily., Disp: , Rfl:     clorazepate (TRANXENE) 3.75 MG Tab, Take 2 tablets (7.5 mg total) by mouth every 12 (twelve) hours as needed (anxiety). 1-2 tablets by mouth every 12 hours as needed for anxiety, Disp: 90 tablet, Rfl: 2    losartan (COZAAR) 25 MG tablet, Take 0.5 tablets (12.5 mg total) by mouth once daily., Disp: 45 tablet, Rfl: 1    metFORMIN (GLUCOPHAGE) 500 MG tablet, Take 2 tablets (1,000 mg total) by mouth 2 (two) times daily with meals., Disp: 360 tablet, Rfl: 1    New & refilled meds:  Requested Prescriptions     Signed Prescriptions Disp Refills    clorazepate (TRANXENE) 3.75 MG Tab 90 tablet 2     Sig: Take 2 tablets (7.5 mg total) by mouth every 12 (twelve) hours as needed (anxiety). 1-2 tablets by mouth every 12 hours as needed for anxiety    losartan (COZAAR) 25 MG tablet 45 tablet 1     Sig: Take 0.5 tablets (12.5 mg total) by mouth once daily.    metFORMIN (GLUCOPHAGE) 500 MG tablet 360 tablet 1     Sig: Take 2 tablets (1,000 mg total) by mouth 2 (two) times daily with meals.     Controlled Substance Agreement on file   reviewed in Epic w/o suspicious activity & no past issues  Random urine drug screen  Refill tranxene    Discussed PSA screen, will wait until we get labs in 3 mths.    High dose flu shot    Current treatment plan is effective, no change in therapy.  Lab results and schedule of future lab studies reviewed with patient.  Reviewed diet, exercise and weight control.    Return to clinic 3 mths for anxiety, T2DM, & HLD w/ fasting labs; and f/u as needed.    Future Appointments   Date Time Provider Department Center   11/10/2022  9:00 AM SYEDA NURSE, [facility] FAMILY MEDICINE [facility]   1/31/2023  9:20 AM PROMISE Alonso [facility] [place]        Signature:  Carmen Ng  FNP

## 2022-10-20 NOTE — TELEPHONE ENCOUNTER
Traveling to [facility] the end of Nov.  Needs ABX & med for nausea for him & his wife.

## 2022-10-24 RX ORDER — CIPROFLOXACIN 500 MG/1
500 TABLET ORAL EVERY 12 HOURS
Qty: 20 TABLET | Refills: 0 | Status: SHIPPED | OUTPATIENT
Start: 2022-10-24 | End: 2022-11-03

## 2022-10-24 RX ORDER — ONDANSETRON HYDROCHLORIDE 8 MG/1
8 TABLET, FILM COATED ORAL EVERY 8 HOURS PRN
Qty: 30 TABLET | Refills: 0 | Status: SHIPPED | OUTPATIENT
Start: 2022-10-24 | End: 2023-01-31

## 2022-10-24 NOTE — TELEPHONE ENCOUNTER
Notify pt rxs for cipro & zofran have been sent to [place] [id number] pharmacy for him & his wife (Kallie Winston) for their travel to [facility] next month.

## 2022-11-08 NOTE — PROGRESS NOTES
[facility]     PATIENT NAME: Jesse Winston   : 1955    AGE: 67 y.o. DATE: 11/10/2022   MRN: [id number]        Reason for Visit / Chief Complaint: Medicare AWV (Subsequent Medicare AWV visit )        Jesse Winston presents for a Subsequent Medicare AWV today.    Review of Systems   Constitutional:  Negative for chills, fever, malaise/fatigue and weight loss.   HENT:  Negative for congestion, ear pain, sinus pain and sore throat.    Eyes:  Negative for blurred vision, double vision, photophobia and redness.   Respiratory:  Negative for cough, sputum production, shortness of breath and wheezing.    Cardiovascular:  Negative for chest pain, palpitations and leg swelling.   Gastrointestinal:  Negative for abdominal pain, blood in stool, constipation, diarrhea, heartburn, nausea and vomiting.   Genitourinary:  Negative for dysuria, flank pain, frequency, hematuria and urgency.   Musculoskeletal:  Negative for back pain, joint pain, myalgias and neck pain.   Skin:  Negative for itching and rash.   Neurological:  Negative for dizziness, tingling, tremors, weakness and headaches.   Psychiatric/Behavioral:  Negative for depression and suicidal ideas. The patient is not nervous/anxious and does not have insomnia.       The following components were reviewed and updated:      Medical/Social/Family History:  Past Medical History:   Diagnosis Date    Anxiety     Diabetes mellitus, type 2     Hyperlipidemia     Hypertension     Inguinal hernia, left 2021    Lipoma 10/26/2021    Obesity (BMI 30-39.9)     Umbilical hernia without obstruction and without gangrene 2021        Family History   Problem Relation Age of Onset    Thyroid disease Mother     Hypertension Mother     Stroke Mother     Colon cancer Father     Sleep apnea Brother     No Known Problems Daughter     Diabetes Maternal Grandmother     No Known Problems Maternal Grandfather     No Known Problems Paternal Grandmother      No Known Problems Paternal Grandfather     Sleep apnea Brother     No Known Problems Brother     No Known Problems Daughter         Past Surgical History:   Procedure Laterality Date    COLONOSCOPY  10/07/2019    HERNIA REPAIR      umbilical & inguinal    LIPOMA RESECTION      removal per Dr. Daniel    REPAIR OF EYELID Bilateral        Social History     Tobacco Use   Smoking Status Never   Smokeless Tobacco Never       Social History     Substance and Sexual Activity   Alcohol Use Yes         Allergies and Current Medications   Review of patient's allergies indicates:  No Known Allergies    Current Outpatient Medications:     aspirin (ECOTRIN) 81 MG EC tablet, Take 81 mg by mouth once daily., Disp: , Rfl:     atorvastatin (LIPITOR) 40 MG tablet, Take 1 tablet (40 mg total) by mouth once daily., Disp: 90 tablet, Rfl: 3    b complex vitamins capsule, Take 1 capsule by mouth once daily., Disp: , Rfl:     cetirizine (ZYRTEC) 10 MG tablet, Take 10 mg by mouth once daily., Disp: , Rfl:     clorazepate (TRANXENE) 3.75 MG Tab, Take 2 tablets (7.5 mg total) by mouth every 12 (twelve) hours as needed (anxiety). 1-2 tablets by mouth every 12 hours as needed for anxiety, Disp: 90 tablet, Rfl: 2    diphenhydrAMINE (BENADRYL) 25 mg capsule, Take 25 mg by mouth nightly as needed for Itching., Disp: , Rfl:     famotidine (PEPCID) 10 MG tablet, Take 10 mg by mouth 2 (two) times daily., Disp: , Rfl:     folic acid/multivit-min/lutein (CENTRUM SILVER ORAL), Take 1 capsule by mouth once daily., Disp: , Rfl:     lactobacillus acidophilus & bulgar (LACTINEX) 100 million cell packet, Take 1 tablet by mouth every evening., Disp: , Rfl:     losartan (COZAAR) 25 MG tablet, Take 0.5 tablets (12.5 mg total) by mouth once daily., Disp: 45 tablet, Rfl: 1    melatonin 10 mg Cap, Take 10 mg by mouth every evening., Disp: , Rfl:     metFORMIN (GLUCOPHAGE) 500 MG tablet, Take 2 tablets (1,000 mg total) by mouth 2 (two) times daily with meals.,  Disp: 360 tablet, Rfl: 1    ondansetron (ZOFRAN) 8 MG tablet, Take 1 tablet (8 mg total) by mouth every 8 (eight) hours as needed for Nausea., Disp: 30 tablet, Rfl: 0    tadalafiL (CIALIS) 5 MG tablet, Take 1 tablet (5 mg total) by mouth once daily., Disp: 90 tablet, Rfl: 3    vitamin D (VITAMIN D3) 1000 units Tab, Take 1,000 Units by mouth once daily., Disp: , Rfl:       Health Risk Assessment   Fall Risk:  not at risk   Obesity: BMI Body mass index is 35.15 kg/m².   Advance Directive: no but information given   Depression: PHQ9- 0   HTN:  DASH diet, exercise, weight management, med compliance, home BP monitoring, and follow-up discussed.   T2DM:  diabetic diet, glucose monitoring, activity level, weight management, med compliance, and follow-up discussed.  STI: not at risk   Statin Use: yes      Health Maintenance   Last eye exam: saw Dr. Loya 12/28/21 & is scheduled for 1/23   Last CV screen with lipids: 7/13/22   Diabetes screening with fasting glucose or A1c: 7/13/22   Colonoscopy: 10/7/19 [facility]   Flu Vaccine: 10/20/22   Pneumonia vaccines: Pneu 13-7/31/18, Pneu 23-10/29/19   COVID vaccine: declined   Hep B vaccine: na   DEXA: na   Last PSA screen: will do next visit   AAA screening: na   HIV Screening: not at risk  Hepatitis C Screen: 4/29/21 non-reactive  Low Dose CT Scan: na    Health Maintenance Topics with due status: Not Due       Topic Last Completion Date    Colorectal Cancer Screening 10/17/2019    Eye Exam 12/28/2021    Diabetes Urine Screening 07/13/2022    Foot Exam 07/13/2022    Lipid Panel 07/13/2022    Hemoglobin A1c 07/13/2022    Low Dose Statin 11/10/2022     Health Maintenance Due   Topic Date Due    PROSTATE-SPECIFIC ANTIGEN  10/27/2022         Lab results available in Epic or see dates from [facility] above:   Lab Results   Component Value Date    CHOL 109 07/13/2022    CHOL 102 10/27/2021     Lab Results   Component Value Date    HDL 34 (L) 07/13/2022    HDL 36 (L)  10/27/2021     Lab Results   Component Value Date    LDLCALC 39 07/13/2022    LDLCALC 44 10/27/2021     Lab Results   Component Value Date    TRIG 179 (H) 07/13/2022    TRIG 110 10/27/2021     Lab Results   Component Value Date    CHOLHDL 3.2 07/13/2022    CHOLHDL 2.8 10/27/2021       Lab Results   Component Value Date    HGBA1C 6.3 07/13/2022       Sodium   Date Value Ref Range Status   07/13/2022 138 136 - 145 mmol/L Final     Potassium   Date Value Ref Range Status   07/13/2022 4.6 3.5 - 5.1 mmol/L Final     Chloride   Date Value Ref Range Status   07/13/2022 106 98 - 107 mmol/L Final     CO2   Date Value Ref Range Status   07/13/2022 27 21 - 32 mmol/L Final     Glucose   Date Value Ref Range Status   07/13/2022 113 (H) 74 - 106 mg/dL Final     BUN   Date Value Ref Range Status   07/13/2022 22 (H) 7 - 18 mg/dL Final     Creatinine   Date Value Ref Range Status   07/13/2022 1.17 0.70 - 1.30 mg/dL Final     Calcium   Date Value Ref Range Status   07/13/2022 9.1 8.5 - 10.1 mg/dL Final     Total Protein   Date Value Ref Range Status   07/13/2022 6.5 6.4 - 8.2 g/dL Final     Albumin   Date Value Ref Range Status   07/13/2022 3.7 3.5 - 5.0 g/dL Final     Bilirubin, Total   Date Value Ref Range Status   07/13/2022 0.3 0.0 - 1.2 mg/dL Final     Alk Phos   Date Value Ref Range Status   07/13/2022 88 45 - 115 U/L Final     AST   Date Value Ref Range Status   07/13/2022 17 15 - 37 U/L Final     ALT   Date Value Ref Range Status   07/13/2022 28 16 - 61 U/L Final     Anion Gap   Date Value Ref Range Status   07/13/2022 10 7 - 16 mmol/L Final     eGFR   Date Value Ref Range Status   07/13/2022 66 >=60 mL/min/1.73m² Final         Lab Results   Component Value Date    PSA 0.738 10/27/2021         Incontinence  Bowel: no  Bladder: no      Care Team  MAKSIM Ng FNP-PCP                Dr. Loya-optometry                Dr. Farris-cardiology    **See Completed Assessments for Annual Wellness visit within the encounter  "summary    The following assessments were completed & reviewed:  Depression Screening  Cognitive function Screening  Timed Get Up Test  Whisper Test  Vision Screen  Health Risk Assessment  Checklist of ADLs and IADLs        Objective  Vitals:    11/10/22 0941   BP: 110/78   Pulse: 67   Resp: 16   Temp: 98.3 °F (36.8 °C)   TempSrc: Oral   SpO2: 95%   Weight: 108 kg (238 lb)   Height: 5' 9" (1.753 m)   PainSc: 0-No pain      Body mass index is 35.15 kg/m².  Ideal body weight: 70.7 kg (155 lb 13.8 oz)       Physical Exam  Vitals and nursing note reviewed.   Constitutional:       General: He is not in acute distress.     Appearance: Normal appearance. He is not ill-appearing.   HENT:      Head: Normocephalic.   Eyes:      Conjunctiva/sclera: Conjunctivae normal.   Cardiovascular:      Rate and Rhythm: Normal rate and regular rhythm.      Heart sounds: Normal heart sounds.   Pulmonary:      Effort: Pulmonary effort is normal. No respiratory distress.      Breath sounds: Normal breath sounds.   Musculoskeletal:      Cervical back: Neck supple.   Skin:     General: Skin is warm and dry.   Neurological:      Mental Status: He is alert and oriented to person, place, and time.         Assessment:     1. Encounter for subsequent annual wellness visit (AWV) in Medicare patient    2. Essential hypertension    3. Hyperlipidemia, unspecified hyperlipidemia type    4. Type 2 diabetes mellitus without complication, without long-term current use of insulin    5. Anxiety         Plan:    PSA planned for next visit      Discussed and provided with a screening schedule and personal prevention plan in accordance with USPSTF age appropriate recommendations and Medicare screening guidelines.   Education, counseling, and referrals were provided as needed.  After Visit Summary printed and given to patient which includes written education and a list of any referrals if indicated.     Education including diet, exercise, falls, preventive " health care for older adults and advanced directives discussed with patient and patient verbalized understanding.      F/u plan for yearly AWV.    Signature: Carmen VIRGEN

## 2022-11-10 ENCOUNTER — OFFICE VISIT (OUTPATIENT)
Dept: FAMILY MEDICINE | Facility: CLINIC | Age: 67
End: 2022-11-10
Payer: MEDICARE

## 2022-11-10 VITALS
SYSTOLIC BLOOD PRESSURE: 110 MMHG | WEIGHT: 238 LBS | BODY MASS INDEX: 35.25 KG/M2 | RESPIRATION RATE: 16 BRPM | HEART RATE: 67 BPM | DIASTOLIC BLOOD PRESSURE: 78 MMHG | OXYGEN SATURATION: 95 % | TEMPERATURE: 98 F | HEIGHT: 69 IN

## 2022-11-10 DIAGNOSIS — Z00.00 ENCOUNTER FOR SUBSEQUENT ANNUAL WELLNESS VISIT (AWV) IN MEDICARE PATIENT: Primary | ICD-10-CM

## 2022-11-10 DIAGNOSIS — F41.9 ANXIETY: ICD-10-CM

## 2022-11-10 DIAGNOSIS — E78.5 HYPERLIPIDEMIA, UNSPECIFIED HYPERLIPIDEMIA TYPE: ICD-10-CM

## 2022-11-10 DIAGNOSIS — E11.9 TYPE 2 DIABETES MELLITUS WITHOUT COMPLICATION, WITHOUT LONG-TERM CURRENT USE OF INSULIN: Chronic | ICD-10-CM

## 2022-11-10 DIAGNOSIS — I10 ESSENTIAL HYPERTENSION: ICD-10-CM

## 2022-11-10 PROCEDURE — G0439 PPPS, SUBSEQ VISIT: HCPCS | Mod: ,,, | Performed by: NURSE PRACTITIONER

## 2022-11-10 PROCEDURE — G0439 PR MEDICARE ANNUAL WELLNESS SUBSEQUENT VISIT: ICD-10-PCS | Mod: ,,, | Performed by: NURSE PRACTITIONER

## 2022-11-10 NOTE — PATIENT INSTRUCTIONS
Counseling and Referral of Other Preventative  (Italic type indicates deductible and co-insurance are waived)    Patient Name: Jesse Winston  Today's Date: 11/10/2022    Health Maintenance         Date Due Completion Date    PROSTATE-SPECIFIC ANTIGEN 10/27/2022 10/27/2021-will draw next visit    Override on 7/30/2020: Done    COVID-19 Vaccine (1) 03/10/2023 (Originally 1955) ---    TETANUS VACCINE 04/20/2023 (Originally 8/12/2020) 8/12/2010    Shingles Vaccine (2 of 3) 04/20/2023 (Originally 9/9/2013) 7/15/2013    Eye Exam 12/28/2022 12/28/2021 (Done)    Override on 12/28/2021: Done (Dr. Loya- Scanned into documents.)    Override on 12/21/2020: Done ([facility] Dr JANNET Loya. Scanned into documents.)    Hemoglobin A1c 01/13/2023 7/13/2022    Diabetes Urine Screening 07/13/2023 7/13/2022    Foot Exam 07/13/2023 7/13/2022    Lipid Panel 07/13/2023 7/13/2022    Override on 10/29/2020: Done    Low Dose Statin 10/20/2023 10/20/2022    Colorectal Cancer Screening 10/17/2029 10/17/2019 (Done)    Override on 10/17/2019: Done (Dr. Mitchell (report printed from [facility] to scan in))          No orders of the defined types were placed in this encounter.

## 2022-11-23 RX ORDER — BALOXAVIR MARBOXIL 40 MG/1
80 TABLET, FILM COATED ORAL ONCE
Qty: 2 TABLET | Refills: 0 | Status: SHIPPED | OUTPATIENT
Start: 2022-11-23 | End: 2022-11-23

## 2023-01-03 LAB
LEFT EYE DM RETINOPATHY: NEGATIVE
RIGHT EYE DM RETINOPATHY: NEGATIVE

## 2023-01-31 ENCOUNTER — OFFICE VISIT (OUTPATIENT)
Dept: FAMILY MEDICINE | Facility: CLINIC | Age: 68
End: 2023-01-31
Payer: MEDICARE

## 2023-01-31 VITALS
RESPIRATION RATE: 18 BRPM | HEIGHT: 69 IN | BODY MASS INDEX: 35.25 KG/M2 | TEMPERATURE: 99 F | WEIGHT: 238 LBS | SYSTOLIC BLOOD PRESSURE: 124 MMHG | DIASTOLIC BLOOD PRESSURE: 70 MMHG | HEART RATE: 70 BPM | OXYGEN SATURATION: 97 %

## 2023-01-31 DIAGNOSIS — Z79.899 ENCOUNTER FOR LONG-TERM (CURRENT) USE OF OTHER MEDICATIONS: ICD-10-CM

## 2023-01-31 DIAGNOSIS — R35.1 BENIGN PROSTATIC HYPERPLASIA WITH NOCTURIA: Chronic | ICD-10-CM

## 2023-01-31 DIAGNOSIS — N40.1 BENIGN PROSTATIC HYPERPLASIA WITH NOCTURIA: Chronic | ICD-10-CM

## 2023-01-31 DIAGNOSIS — E11.9 TYPE 2 DIABETES MELLITUS WITHOUT COMPLICATION, WITHOUT LONG-TERM CURRENT USE OF INSULIN: Primary | Chronic | ICD-10-CM

## 2023-01-31 DIAGNOSIS — E78.2 MIXED HYPERLIPIDEMIA: Chronic | ICD-10-CM

## 2023-01-31 DIAGNOSIS — E66.9 OBESITY, CLASS II, BMI 35-39.9: Chronic | ICD-10-CM

## 2023-01-31 DIAGNOSIS — I10 ESSENTIAL HYPERTENSION: ICD-10-CM

## 2023-01-31 DIAGNOSIS — F41.9 ANXIETY: Chronic | ICD-10-CM

## 2023-01-31 DIAGNOSIS — Z12.5 PROSTATE CANCER SCREENING: ICD-10-CM

## 2023-01-31 PROBLEM — G47.33 OSA ON CPAP: Chronic | Status: ACTIVE | Noted: 2021-04-29

## 2023-01-31 PROBLEM — Z86.16 PERSONAL HISTORY OF COVID-19: Status: RESOLVED | Noted: 2021-04-29 | Resolved: 2023-01-31

## 2023-01-31 LAB
ALBUMIN SERPL BCP-MCNC: 3.6 G/DL (ref 3.5–5)
ALBUMIN/GLOB SERPL: 1.1 {RATIO}
ALP SERPL-CCNC: 87 U/L (ref 45–115)
ALT SERPL W P-5'-P-CCNC: 34 U/L (ref 16–61)
AMPHET UR QL SCN: NEGATIVE
ANION GAP SERPL CALCULATED.3IONS-SCNC: 7 MMOL/L (ref 7–16)
AST SERPL W P-5'-P-CCNC: 18 U/L (ref 15–37)
BARBITURATES UR QL SCN: NEGATIVE
BASOPHILS # BLD AUTO: 0.03 K/UL (ref 0–0.2)
BASOPHILS NFR BLD AUTO: 0.5 % (ref 0–1)
BENZODIAZ METAB UR QL SCN: POSITIVE
BILIRUB SERPL-MCNC: 0.3 MG/DL (ref ?–1.2)
BUN SERPL-MCNC: 15 MG/DL (ref 7–18)
BUN/CREAT SERPL: 14 (ref 6–20)
CALCIUM SERPL-MCNC: 9.3 MG/DL (ref 8.5–10.1)
CANNABINOIDS UR QL SCN: NEGATIVE
CHLORIDE SERPL-SCNC: 108 MMOL/L (ref 98–107)
CHOLEST SERPL-MCNC: 111 MG/DL (ref 0–200)
CHOLEST/HDLC SERPL: 3.4 {RATIO}
CO2 SERPL-SCNC: 30 MMOL/L (ref 21–32)
COCAINE UR QL SCN: NEGATIVE
CREAT SERPL-MCNC: 1.11 MG/DL (ref 0.7–1.3)
DIFFERENTIAL METHOD BLD: ABNORMAL
EGFR (NO RACE VARIABLE) (RUSH/TITUS): 72 ML/MIN/1.73M²
EOSINOPHIL # BLD AUTO: 0.08 K/UL (ref 0–0.5)
EOSINOPHIL NFR BLD AUTO: 1.4 % (ref 1–4)
ERYTHROCYTE [DISTWIDTH] IN BLOOD BY AUTOMATED COUNT: 12.7 % (ref 11.5–14.5)
EST. AVERAGE GLUCOSE BLD GHB EST-MCNC: 127 MG/DL
GLOBULIN SER-MCNC: 3.3 G/DL (ref 2–4)
GLUCOSE SERPL-MCNC: 133 MG/DL (ref 74–106)
HBA1C MFR BLD HPLC: 6.4 % (ref 4.5–6.6)
HCT VFR BLD AUTO: 41.9 % (ref 40–54)
HDLC SERPL-MCNC: 33 MG/DL (ref 40–60)
HGB BLD-MCNC: 14.2 G/DL (ref 13.5–18)
IMM GRANULOCYTES # BLD AUTO: 0.01 K/UL (ref 0–0.04)
IMM GRANULOCYTES NFR BLD: 0.2 % (ref 0–0.4)
LDLC SERPL CALC-MCNC: 42 MG/DL
LDLC/HDLC SERPL: 1.3 {RATIO}
LYMPHOCYTES # BLD AUTO: 1.45 K/UL (ref 1–4.8)
LYMPHOCYTES NFR BLD AUTO: 25.8 % (ref 27–41)
MCH RBC QN AUTO: 29.9 PG (ref 27–31)
MCHC RBC AUTO-ENTMCNC: 33.9 G/DL (ref 32–36)
MCV RBC AUTO: 88.2 FL (ref 80–96)
MONOCYTES # BLD AUTO: 0.51 K/UL (ref 0–0.8)
MONOCYTES NFR BLD AUTO: 9.1 % (ref 2–6)
MPC BLD CALC-MCNC: 10.4 FL (ref 9.4–12.4)
NEUTROPHILS # BLD AUTO: 3.53 K/UL (ref 1.8–7.7)
NEUTROPHILS NFR BLD AUTO: 63 % (ref 53–65)
NONHDLC SERPL-MCNC: 78 MG/DL
NRBC # BLD AUTO: 0 X10E3/UL
NRBC, AUTO (.00): 0 %
OPIATES UR QL SCN: NEGATIVE
PCP UR QL SCN: NEGATIVE
PLATELET # BLD AUTO: 219 K/UL (ref 150–400)
POTASSIUM SERPL-SCNC: 4.5 MMOL/L (ref 3.5–5.1)
PROT SERPL-MCNC: 6.9 G/DL (ref 6.4–8.2)
PSA SERPL-MCNC: 0.59 NG/ML
RBC # BLD AUTO: 4.75 M/UL (ref 4.6–6.2)
SODIUM SERPL-SCNC: 140 MMOL/L (ref 136–145)
TRIGL SERPL-MCNC: 181 MG/DL (ref 35–150)
TSH SERPL DL<=0.005 MIU/L-ACNC: 1.65 UIU/ML (ref 0.36–3.74)
VLDLC SERPL-MCNC: 36 MG/DL
WBC # BLD AUTO: 5.61 K/UL (ref 4.5–11)

## 2023-01-31 PROCEDURE — 99214 PR OFFICE/OUTPT VISIT, EST, LEVL IV, 30-39 MIN: ICD-10-PCS | Mod: ,,, | Performed by: NURSE PRACTITIONER

## 2023-01-31 PROCEDURE — 80061 LIPID PANEL: ICD-10-PCS | Mod: ,,, | Performed by: CLINICAL MEDICAL LABORATORY

## 2023-01-31 PROCEDURE — 80053 COMPREHEN METABOLIC PANEL: CPT | Mod: ,,, | Performed by: CLINICAL MEDICAL LABORATORY

## 2023-01-31 PROCEDURE — 80307 DRUG TEST PRSMV CHEM ANLYZR: CPT | Mod: ,,, | Performed by: CLINICAL MEDICAL LABORATORY

## 2023-01-31 PROCEDURE — 99214 OFFICE O/P EST MOD 30 MIN: CPT | Mod: ,,, | Performed by: NURSE PRACTITIONER

## 2023-01-31 PROCEDURE — 83036 HEMOGLOBIN GLYCOSYLATED A1C: CPT | Mod: ,,, | Performed by: CLINICAL MEDICAL LABORATORY

## 2023-01-31 PROCEDURE — 84443 ASSAY THYROID STIM HORMONE: CPT | Mod: ,,, | Performed by: CLINICAL MEDICAL LABORATORY

## 2023-01-31 PROCEDURE — 80053 COMPREHENSIVE METABOLIC PANEL: ICD-10-PCS | Mod: ,,, | Performed by: CLINICAL MEDICAL LABORATORY

## 2023-01-31 PROCEDURE — 83036 HEMOGLOBIN A1C: ICD-10-PCS | Mod: ,,, | Performed by: CLINICAL MEDICAL LABORATORY

## 2023-01-31 PROCEDURE — G0103 PSA, SCREENING: ICD-10-PCS | Mod: ,,, | Performed by: CLINICAL MEDICAL LABORATORY

## 2023-01-31 PROCEDURE — 85025 CBC WITH DIFFERENTIAL: ICD-10-PCS | Mod: ,,, | Performed by: CLINICAL MEDICAL LABORATORY

## 2023-01-31 PROCEDURE — 84443 TSH: ICD-10-PCS | Mod: ,,, | Performed by: CLINICAL MEDICAL LABORATORY

## 2023-01-31 PROCEDURE — G0103 PSA SCREENING: HCPCS | Mod: ,,, | Performed by: CLINICAL MEDICAL LABORATORY

## 2023-01-31 PROCEDURE — 80307 DRUG SCREEN, URINE (BEAKER): ICD-10-PCS | Mod: ,,, | Performed by: CLINICAL MEDICAL LABORATORY

## 2023-01-31 PROCEDURE — 85025 COMPLETE CBC W/AUTO DIFF WBC: CPT | Mod: ,,, | Performed by: CLINICAL MEDICAL LABORATORY

## 2023-01-31 PROCEDURE — 80061 LIPID PANEL: CPT | Mod: ,,, | Performed by: CLINICAL MEDICAL LABORATORY

## 2023-01-31 RX ORDER — CLORAZEPATE DIPOTASSIUM 3.75 MG/1
7.5 TABLET ORAL EVERY 12 HOURS PRN
Qty: 90 TABLET | Refills: 2 | Status: SHIPPED | OUTPATIENT
Start: 2023-01-31 | End: 2023-05-16 | Stop reason: SDUPTHER

## 2023-01-31 RX ORDER — METFORMIN HYDROCHLORIDE 500 MG/1
1000 TABLET ORAL 2 TIMES DAILY WITH MEALS
Qty: 360 TABLET | Refills: 1 | Status: SHIPPED | OUTPATIENT
Start: 2023-01-31 | End: 2024-03-11

## 2023-01-31 RX ORDER — TADALAFIL 5 MG/1
5 TABLET ORAL DAILY
Qty: 90 TABLET | Refills: 3 | Status: SHIPPED | OUTPATIENT
Start: 2023-01-31 | End: 2024-03-13 | Stop reason: SDUPTHER

## 2023-01-31 RX ORDER — LOSARTAN POTASSIUM 25 MG/1
12.5 TABLET ORAL DAILY
Qty: 45 TABLET | Refills: 1 | Status: SHIPPED | OUTPATIENT
Start: 2023-01-31 | End: 2023-08-30 | Stop reason: SDUPTHER

## 2023-01-31 NOTE — PROGRESS NOTES
[facility]       PATIENT NAME: Jesse Winston   : 1955    AGE: 67 y.o. DATE OF ENCOUNTER: 23    MRN: [id number]      PCP: PROMISE Alonso    Reason for Visit / Chief Complaint:  Anxiety and Follow-up (Patient presents to clinic for 3 month follow up of anxiety)         274}    Subjective:     HPI:    Presents for 3 mth f/u for anxiety, T2DM, & HLD w/ fasting labs  3 mth f/u benzo monitoring w/ med refill.  On low dose tranxene many years, well controlled.    Saw Dr. Loya around 1/3/23 for eye exam.    Review of Systems:   Review of Systems   Constitutional: Negative.    Respiratory: Negative.          On CPAP for SHEILA.   Cardiovascular: Negative.    Gastrointestinal: Negative.    Endocrine: Negative.    Genitourinary: Negative.    Neurological: Negative.    Psychiatric/Behavioral:  Negative for sleep disturbance and suicidal ideas. Nervous/anxious: anxiety controlled with tranxene as needed for years.      Allergies and Meds: 274}   Review of patient's allergies indicates:  No Known Allergies     Current Outpatient Medications:     aspirin (ECOTRIN) 81 MG EC tablet, Take 81 mg by mouth once daily., Disp: , Rfl:     atorvastatin (LIPITOR) 40 MG tablet, Take 1 tablet (40 mg total) by mouth once daily., Disp: 90 tablet, Rfl: 3    b complex vitamins capsule, Take 1 capsule by mouth once daily., Disp: , Rfl:     cetirizine (ZYRTEC) 10 MG tablet, Take 10 mg by mouth once daily., Disp: , Rfl:     diphenhydrAMINE (BENADRYL) 25 mg capsule, Take 25 mg by mouth nightly as needed for Itching or Insomnia., Disp: , Rfl:     famotidine (PEPCID) 10 MG tablet, Take 10 mg by mouth once daily., Disp: , Rfl:     folic acid/multivit-min/lutein (CENTRUM SILVER ORAL), Take 1 capsule by mouth once daily., Disp: , Rfl:     lactobacillus acidophilus & bulgar (LACTINEX) 100 million cell packet, Take 1 tablet by mouth every evening., Disp: , Rfl:     melatonin 10 mg Cap, Take 10 mg by  mouth every evening., Disp: , Rfl:     vitamin D (VITAMIN D3) 1000 units Tab, Take 1,000 Units by mouth once daily., Disp: , Rfl:     clorazepate (TRANXENE) 3.75 MG Tab, Take 2 tablets (7.5 mg total) by mouth every 12 (twelve) hours as needed (anxiety). 1-2 tablets by mouth every 12 hours as needed for anxiety, Disp: 90 tablet, Rfl: 2    losartan (COZAAR) 25 MG tablet, Take 0.5 tablets (12.5 mg total) by mouth once daily., Disp: 45 tablet, Rfl: 1    metFORMIN (GLUCOPHAGE) 500 MG tablet, Take 2 tablets (1,000 mg total) by mouth 2 (two) times daily with meals., Disp: 360 tablet, Rfl: 1    tadalafiL (CIALIS) 5 MG tablet, Take 1 tablet (5 mg total) by mouth once daily., Disp: 90 tablet, Rfl: 3    Labs:274}    I have reviewed old labs below:  Lab Results   Component Value Date    WBC 5.62 10/27/2021    RBC 4.69 10/27/2021    HGB 14.2 10/27/2021    HCT 43.5 10/27/2021     10/27/2021     07/13/2022    K 4.6 07/13/2022     07/13/2022    CALCIUM 9.1 07/13/2022     (H) 07/13/2022    BUN 22 (H) 07/13/2022    CREATININE 1.17 07/13/2022    EGFRNONAA 66 07/13/2022    ALT 28 07/13/2022    AST 17 07/13/2022    CHOL 109 07/13/2022    TRIG 179 (H) 07/13/2022    HDL 34 (L) 07/13/2022    LDLCALC 39 07/13/2022    TSH 2.060 07/13/2022    PSA 0.738 10/27/2021    HGBA1C 6.3 07/13/2022    MICROALBUR 0.6 07/13/2022       Medical History: 274}     Past Medical History:   Diagnosis Date    Anxiety     Diabetes mellitus, type 2     Hyperlipidemia     Hypertension     Inguinal hernia, left 4/29/2021    Lipoma 10/26/2021    Obesity (BMI 30-39.9)     Personal history of COVID-19 4/29/2021    Umbilical hernia without obstruction and without gangrene 4/29/2021      Social History     Tobacco Use   Smoking Status Never   Smokeless Tobacco Never      Past Surgical History:   Procedure Laterality Date    COLONOSCOPY  10/07/2019    HERNIA REPAIR      umbilical & inguinal    LIPOMA RESECTION      removal per Dr. Daniel    REPAIR  "OF EYELID Bilateral         Health Maintenance: 274}     Objective:  274}   /70 (BP Location: Left arm, Patient Position: Sitting, BP Method: Large (Manual))   Pulse 70   Temp 98.5 °F (36.9 °C) (Oral)   Resp 18   Ht 5' 9" (1.753 m)   Wt 108 kg (238 lb)   SpO2 97%   BMI 35.15 kg/m²     Wt Readings from Last 3 Encounters:   01/31/23 108 kg (238 lb)   11/10/22 108 kg (238 lb)   10/20/22 106.4 kg (234 lb 9.6 oz)     BP Readings from Last 3 Encounters:   01/31/23 124/70   11/10/22 110/78   10/20/22 120/84     Body mass index is 35.15 kg/m².     Physical Exam  Vitals and nursing note reviewed.   Constitutional:       General: He is not in acute distress.     Appearance: Normal appearance.   HENT:      Head: Normocephalic.      Right Ear: Tympanic membrane, ear canal and external ear normal.      Left Ear: Tympanic membrane, ear canal and external ear normal.      Nose: Nose normal.      Mouth/Throat:      Mouth: Mucous membranes are moist.      Pharynx: Oropharynx is clear.   Eyes:      Conjunctiva/sclera: Conjunctivae normal.   Neck:      Thyroid: No thyromegaly or thyroid tenderness.      Trachea: Trachea normal.   Cardiovascular:      Rate and Rhythm: Normal rate and regular rhythm.      Pulses: Normal pulses.      Heart sounds: Normal heart sounds.   Pulmonary:      Effort: Pulmonary effort is normal. No respiratory distress.      Breath sounds: Normal breath sounds.   Musculoskeletal:      Cervical back: Neck supple.      Right lower leg: No edema.      Left lower leg: No edema.   Lymphadenopathy:      Cervical: No cervical adenopathy.   Skin:     General: Skin is warm and dry.   Neurological:      Mental Status: He is alert and oriented to person, place, and time.   Psychiatric:         Mood and Affect: Mood normal.         Behavior: Behavior normal.        Assessment and Plan: 274}     1. Type 2 diabetes mellitus without complication, without long-term current use of insulin  -     Comprehensive " Metabolic Panel; Future; Expected date: 01/31/2023  -     Hemoglobin A1C; Future; Expected date: 01/31/2023  -     metFORMIN (GLUCOPHAGE) 500 MG tablet; Take 2 tablets (1,000 mg total) by mouth 2 (two) times daily with meals.  Dispense: 360 tablet; Refill: 1    2. Anxiety  -     Drug Screen, Urine; Future; Expected date: 01/31/2023  -     clorazepate (TRANXENE) 3.75 MG Tab; Take 2 tablets (7.5 mg total) by mouth every 12 (twelve) hours as needed (anxiety). 1-2 tablets by mouth every 12 hours as needed for anxiety  Dispense: 90 tablet; Refill: 2    3. Obesity, Class II, BMI 35-39.9    4. Prostate cancer screening  -     PSA, Screening; Future; Expected date: 01/31/2023    5. Encounter for long-term (current) use of other medications  -     CBC Auto Differential; Future; Expected date: 01/31/2023  -     TSH; Future; Expected date: 01/31/2023  -     Drug Screen, Urine; Future; Expected date: 01/31/2023    6. Mixed hyperlipidemia  -     Comprehensive Metabolic Panel; Future; Expected date: 01/31/2023  -     Lipid Panel; Future; Expected date: 01/31/2023    7. Benign prostatic hyperplasia with nocturia  Overview:  Controlled with daily Cialis    Orders:  -     tadalafiL (CIALIS) 5 MG tablet; Take 1 tablet (5 mg total) by mouth once daily.  Dispense: 90 tablet; Refill: 3    8. Essential hypertension  -     losartan (COZAAR) 25 MG tablet; Take 0.5 tablets (12.5 mg total) by mouth once daily.  Dispense: 45 tablet; Refill: 1       Good [place] controlled substance agreement on file.  MS  report reviewed via Epic with no suspicious activity noted.  Requires f/u every 3 months for med monitoring.  Random drug screen.    Return to clinic 3 mth f/u anxiety, benzo monitoring; and sooner as needed.    Future Appointments   Date Time Provider Department Center   5/10/2023  3:40 PM PROMISE Alonso [facility] [place]   11/15/2023  9:00 AM AWV NURSE, [facility] [place]         Signature:  PROMISE Alonso

## 2023-02-05 ENCOUNTER — PATIENT MESSAGE (OUTPATIENT)
Dept: FAMILY MEDICINE | Facility: CLINIC | Age: 68
End: 2023-02-05
Payer: MEDICARE

## 2023-02-15 ENCOUNTER — PATIENT OUTREACH (OUTPATIENT)
Dept: ADMINISTRATIVE | Facility: HOSPITAL | Age: 68
End: 2023-02-15

## 2023-03-06 ENCOUNTER — PATIENT OUTREACH (OUTPATIENT)
Dept: FAMILY MEDICINE | Facility: CLINIC | Age: 68
End: 2023-03-06
Payer: MEDICARE

## 2023-05-09 DIAGNOSIS — Z71.89 COMPLEX CARE COORDINATION: ICD-10-CM

## 2023-05-16 ENCOUNTER — OFFICE VISIT (OUTPATIENT)
Dept: FAMILY MEDICINE | Facility: CLINIC | Age: 68
End: 2023-05-16
Payer: MEDICARE

## 2023-05-16 VITALS
WEIGHT: 230 LBS | BODY MASS INDEX: 34.07 KG/M2 | HEART RATE: 59 BPM | RESPIRATION RATE: 20 BRPM | HEIGHT: 69 IN | SYSTOLIC BLOOD PRESSURE: 125 MMHG | OXYGEN SATURATION: 96 % | TEMPERATURE: 98 F | DIASTOLIC BLOOD PRESSURE: 82 MMHG

## 2023-05-16 DIAGNOSIS — E11.9 TYPE 2 DIABETES MELLITUS WITHOUT COMPLICATION, WITHOUT LONG-TERM CURRENT USE OF INSULIN: Chronic | ICD-10-CM

## 2023-05-16 DIAGNOSIS — Z79.899 ENCOUNTER FOR LONG-TERM (CURRENT) USE OF OTHER MEDICATIONS: ICD-10-CM

## 2023-05-16 DIAGNOSIS — E66.01 SEVERE OBESITY (BMI 35.0-35.9 WITH COMORBIDITY): Chronic | ICD-10-CM

## 2023-05-16 DIAGNOSIS — F41.9 ANXIETY: Primary | Chronic | ICD-10-CM

## 2023-05-16 LAB
AMPHET UR QL SCN: NEGATIVE
BARBITURATES UR QL SCN: NEGATIVE
BENZODIAZ METAB UR QL SCN: POSITIVE
CANNABINOIDS UR QL SCN: NEGATIVE
COCAINE UR QL SCN: NEGATIVE
CREAT UR-MCNC: 115 MG/DL (ref 39–259)
MICROALBUMIN UR-MCNC: 0.6 MG/DL (ref 0–2.8)
MICROALBUMIN/CREAT RATIO PNL UR: 5.2 MG/G (ref 0–30)
OPIATES UR QL SCN: NEGATIVE
PCP UR QL SCN: NEGATIVE

## 2023-05-16 PROCEDURE — 82570 MICROALBUMIN / CREATININE RATIO URINE: ICD-10-PCS | Mod: 59,,, | Performed by: CLINICAL MEDICAL LABORATORY

## 2023-05-16 PROCEDURE — 82043 MICROALBUMIN / CREATININE RATIO URINE: ICD-10-PCS | Mod: ,,, | Performed by: CLINICAL MEDICAL LABORATORY

## 2023-05-16 PROCEDURE — 82043 UR ALBUMIN QUANTITATIVE: CPT | Mod: ,,, | Performed by: CLINICAL MEDICAL LABORATORY

## 2023-05-16 PROCEDURE — 80307 DRUG TEST PRSMV CHEM ANLYZR: CPT | Mod: ,,, | Performed by: CLINICAL MEDICAL LABORATORY

## 2023-05-16 PROCEDURE — 99214 OFFICE O/P EST MOD 30 MIN: CPT | Mod: ,,, | Performed by: NURSE PRACTITIONER

## 2023-05-16 PROCEDURE — 99214 PR OFFICE/OUTPT VISIT, EST, LEVL IV, 30-39 MIN: ICD-10-PCS | Mod: ,,, | Performed by: NURSE PRACTITIONER

## 2023-05-16 PROCEDURE — 80307 DRUG SCREEN, URINE (BEAKER): ICD-10-PCS | Mod: ,,, | Performed by: CLINICAL MEDICAL LABORATORY

## 2023-05-16 PROCEDURE — 82570 ASSAY OF URINE CREATININE: CPT | Mod: 59,,, | Performed by: CLINICAL MEDICAL LABORATORY

## 2023-05-16 RX ORDER — SEMAGLUTIDE 0.68 MG/ML
0.5 INJECTION, SOLUTION SUBCUTANEOUS
Qty: 9 ML | Refills: 3 | Status: SHIPPED | OUTPATIENT
Start: 2023-05-16 | End: 2023-08-02 | Stop reason: DRUGHIGH

## 2023-05-16 RX ORDER — SEMAGLUTIDE 0.68 MG/ML
0.5 INJECTION, SOLUTION SUBCUTANEOUS
Qty: 3 ML | Refills: 2 | Status: SHIPPED | OUTPATIENT
Start: 2023-05-16 | End: 2023-05-16

## 2023-05-16 RX ORDER — SEMAGLUTIDE 0.68 MG/ML
0.5 INJECTION, SOLUTION SUBCUTANEOUS
Qty: 3 ML | Refills: 2 | Status: SHIPPED | OUTPATIENT
Start: 2023-05-16 | End: 2023-05-16 | Stop reason: DRUGHIGH

## 2023-05-16 RX ORDER — CLORAZEPATE DIPOTASSIUM 3.75 MG/1
7.5 TABLET ORAL EVERY 12 HOURS PRN
Qty: 90 TABLET | Refills: 2 | Status: SHIPPED | OUTPATIENT
Start: 2023-05-16 | End: 2023-08-30 | Stop reason: SDUPTHER

## 2023-05-16 NOTE — PROGRESS NOTES
[facility] - FAMILY MEDICINE       PATIENT NAME: Jesse Winston   : 1955    AGE: 68 y.o. DATE OF ENCOUNTER: 23    MRN: [id number]      PCP: PROMISE Alonso    Reason for Visit / Chief Complaint:  Follow-up (Patient presents to the clinic 3m f/u anxiety)         274}    Subjective:     HPI:    Presents for 3 mth f/u anxiety, benzo monitoring w/ med refill.  On low dose tranxene many years, well controlled.    T2DM on max dose metformin with A1c gradually increasing over time, is interested in adding Ozempic    Review of Systems:   Review of Systems   Constitutional: Negative.    Respiratory: Negative.          On CPAP for SHEILA.   Cardiovascular: Negative.    Gastrointestinal: Negative.    Endocrine: Negative.    Genitourinary: Negative.    Neurological: Negative.    Psychiatric/Behavioral:  Negative for sleep disturbance and suicidal ideas. Nervous/anxious: anxiety controlled with tranxene as needed for years.      Allergies and Meds: 274}   Review of patient's allergies indicates:  No Known Allergies     Current Outpatient Medications:     aspirin (ECOTRIN) 81 MG EC tablet, Take 81 mg by mouth once daily., Disp: , Rfl:     atorvastatin (LIPITOR) 40 MG tablet, Take 1 tablet (40 mg total) by mouth once daily., Disp: 90 tablet, Rfl: 3    b complex vitamins capsule, Take 1 capsule by mouth once daily., Disp: , Rfl:     cetirizine (ZYRTEC) 10 MG tablet, Take 10 mg by mouth once daily., Disp: , Rfl:     diphenhydrAMINE (BENADRYL) 25 mg capsule, Take 25 mg by mouth nightly as needed for Itching or Insomnia., Disp: , Rfl:     famotidine (PEPCID) 10 MG tablet, Take 10 mg by mouth once daily., Disp: , Rfl:     folic acid/multivit-min/lutein (CENTRUM SILVER ORAL), Take 1 capsule by mouth once daily., Disp: , Rfl:     lactobacillus acidophilus & bulgar (LACTINEX) 100 million cell packet, Take 1 tablet by mouth every evening., Disp: , Rfl:     losartan (COZAAR) 25 MG tablet, Take 0.5 tablets  (12.5 mg total) by mouth once daily., Disp: 45 tablet, Rfl: 1    melatonin 10 mg Cap, Take 10 mg by mouth every evening., Disp: , Rfl:     metFORMIN (GLUCOPHAGE) 500 MG tablet, Take 2 tablets (1,000 mg total) by mouth 2 (two) times daily with meals., Disp: 360 tablet, Rfl: 1    tadalafiL (CIALIS) 5 MG tablet, Take 1 tablet (5 mg total) by mouth once daily., Disp: 90 tablet, Rfl: 3    vitamin D (VITAMIN D3) 1000 units Tab, Take 1,000 Units by mouth once daily., Disp: , Rfl:     clorazepate (TRANXENE) 3.75 MG Tab, Take 2 tablets (7.5 mg total) by mouth every 12 (twelve) hours as needed (anxiety). 1-2 tablets by mouth every 12 hours as needed for anxiety, Disp: 90 tablet, Rfl: 2    semaglutide (OZEMPIC) 0.25 mg or 0.5 mg (2 mg/3 mL) pen injector, Inject 0.5 mg into the skin every 7 days. Start with 0.25 mg weekly x 4 weeks then 0.5 mg weekly, Disp: 9 mL, Rfl: 3    Labs:274}    I have reviewed old labs below:  Lab Results   Component Value Date    WBC 5.61 01/31/2023    RBC 4.75 01/31/2023    HGB 14.2 01/31/2023    HCT 41.9 01/31/2023     01/31/2023     01/31/2023    K 4.5 01/31/2023     (H) 01/31/2023    CALCIUM 9.3 01/31/2023     (H) 01/31/2023    BUN 15 01/31/2023    CREATININE 1.11 01/31/2023    EGFRNONAA 66 07/13/2022    ALT 34 01/31/2023    AST 18 01/31/2023    CHOL 111 01/31/2023    TRIG 181 (H) 01/31/2023    HDL 33 (L) 01/31/2023    LDLCALC 42 01/31/2023    TSH 1.650 01/31/2023    PSA 0.594 01/31/2023    HGBA1C 6.4 01/31/2023    MICROALBUR 0.6 07/13/2022       Medical History: 274}     Past Medical History:   Diagnosis Date    Anxiety     Diabetes mellitus, type 2     Hyperlipidemia     Hypertension     Inguinal hernia, left 4/29/2021    Lipoma 10/26/2021    Obesity (BMI 30-39.9)     Personal history of COVID-19 4/29/2021    Umbilical hernia without obstruction and without gangrene 4/29/2021      Social History     Tobacco Use   Smoking Status Never   Smokeless Tobacco Never      Past  "Surgical History:   Procedure Laterality Date    COLONOSCOPY  10/07/2019    HERNIA REPAIR      umbilical & inguinal    LIPOMA RESECTION      removal per Dr. Daniel    REPAIR OF EYELID Bilateral       Objective:  274}   /82 (BP Location: Right arm, Patient Position: Sitting, BP Method: Large (Automatic))   Pulse (!) 59   Temp 98 °F (36.7 °C) (Oral)   Resp 20   Ht 5' 9" (1.753 m)   Wt 104.3 kg (230 lb)   SpO2 96%   BMI 33.97 kg/m²     Wt Readings from Last 3 Encounters:   05/16/23 104.3 kg (230 lb)   01/31/23 108 kg (238 lb)   11/10/22 108 kg (238 lb)     BP Readings from Last 3 Encounters:   05/16/23 125/82   01/31/23 124/70   11/10/22 110/78     Body mass index is 33.97 kg/m².     Physical Exam  Vitals and nursing note reviewed.   Constitutional:       General: He is not in acute distress.     Appearance: Normal appearance.   HENT:      Head: Normocephalic.   Eyes:      Conjunctiva/sclera: Conjunctivae normal.      Pupils: Pupils are equal, round, and reactive to light.   Cardiovascular:      Rate and Rhythm: Normal rate and regular rhythm.      Pulses:           Dorsalis pedis pulses are 3+ on the right side and 3+ on the left side.        Posterior tibial pulses are 3+ on the right side and 3+ on the left side.      Heart sounds: Normal heart sounds.   Pulmonary:      Effort: Pulmonary effort is normal. No respiratory distress.      Breath sounds: Normal breath sounds.   Musculoskeletal:      Right lower leg: No edema.      Left lower leg: No edema.      Right foot: Normal range of motion. No deformity or bunion.      Left foot: Normal range of motion. No deformity or bunion.   Feet:      Right foot:      Protective Sensation: 10 sites tested.  10 sites sensed.      Skin integrity: Skin integrity normal. No ulcer, blister, erythema, warmth or callus.      Toenail Condition: Right toenails are normal.      Left foot:      Protective Sensation: 10 sites tested.  10 sites sensed.      Skin integrity: Skin " integrity normal. No ulcer, blister, erythema, warmth or callus.      Toenail Condition: Left toenails are normal.   Lymphadenopathy:      Upper Body:      Right upper body: No supraclavicular adenopathy.      Left upper body: No supraclavicular adenopathy.   Skin:     General: Skin is warm and dry.      Findings: No rash.   Neurological:      General: No focal deficit present.      Mental Status: He is alert and oriented to person, place, and time.   Psychiatric:         Mood and Affect: Mood normal.         Behavior: Behavior normal.        Assessment and Plan: 274}     1. Anxiety  -     Drug Screen, Urine; Future; Expected date: 05/16/2023  -     clorazepate (TRANXENE) 3.75 MG Tab; Take 2 tablets (7.5 mg total) by mouth every 12 (twelve) hours as needed (anxiety). 1-2 tablets by mouth every 12 hours as needed for anxiety  Dispense: 90 tablet; Refill: 2    2. Type 2 diabetes mellitus without complication, without long-term current use of insulin  Comments:  Controlled but A1c gradually increasing over time despite being on metformin, add Ozempic.  Ozempic 0.25 mg wkly x 4 wks then 0.5 mg wkly.  Orders:  -     Microalbumin/Creatinine Ratio, Urine; Future; Expected date: 05/16/2023  -     Discontinue: semaglutide (OZEMPIC) 0.25 mg or 0.5 mg (2 mg/3 mL) pen injector; Inject 0.5 mg into the skin every 7 days. Start with 0.25 mg weekly x 4 weeks then 0.5 mg weekly x 4 weeks then plan to increase to 1 mg weekly for continued use if tolerating.  Dispense: 3 mL; Refill: 2  -     Discontinue: semaglutide (OZEMPIC) 0.25 mg or 0.5 mg (2 mg/3 mL) pen injector; Inject 0.5 mg into the skin every 7 days. Start with 0.25 mg weekly x 4 weeks then 0.5 mg weekly  Dispense: 3 mL; Refill: 2  -     semaglutide (OZEMPIC) 0.25 mg or 0.5 mg (2 mg/3 mL) pen injector; Inject 0.5 mg into the skin every 7 days. Start with 0.25 mg weekly x 4 weeks then 0.5 mg weekly  Dispense: 9 mL; Refill: 3    3. Encounter for long-term (current) use of  other medications  -     Drug Screen, Urine; Future; Expected date: 05/16/2023    4. Severe obesity (BMI 35.0-35.9 with comorbidity)       Good [place] controlled substance agreement on file.  MS  report reviewed via Epic with no suspicious activity noted.  Requires f/u every 3 months for med monitoring.  Random drug screen    Return to clinic 3-mth f/u T2DM, anxiety, HLD, fasting for labs; and sooner as needed.    Future Appointments   Date Time Provider Department Center   8/30/2023  7:40 AM PROMISE Alonso [facility]   11/15/2023  9:00 AM AWV NURSE, [facility] FAMILY MEDICINE [facility] [place]        Signature:  PROMISE Alonso

## 2023-07-07 ENCOUNTER — OFFICE VISIT (OUTPATIENT)
Dept: FAMILY MEDICINE | Facility: CLINIC | Age: 68
End: 2023-07-07
Payer: MEDICARE

## 2023-07-07 VITALS
WEIGHT: 223.19 LBS | HEART RATE: 63 BPM | OXYGEN SATURATION: 94 % | DIASTOLIC BLOOD PRESSURE: 83 MMHG | TEMPERATURE: 98 F | BODY MASS INDEX: 33.06 KG/M2 | SYSTOLIC BLOOD PRESSURE: 129 MMHG | RESPIRATION RATE: 18 BRPM | HEIGHT: 69 IN

## 2023-07-07 DIAGNOSIS — M54.6 ACUTE MIDLINE THORACIC BACK PAIN: Primary | ICD-10-CM

## 2023-07-07 DIAGNOSIS — M25.552 LEFT HIP PAIN: ICD-10-CM

## 2023-07-07 DIAGNOSIS — M54.10 RADICULOPATHY, UNSPECIFIED SPINAL REGION: ICD-10-CM

## 2023-07-07 PROCEDURE — 96372 THER/PROPH/DIAG INJ SC/IM: CPT | Mod: ,,, | Performed by: NURSE PRACTITIONER

## 2023-07-07 PROCEDURE — 99213 PR OFFICE/OUTPT VISIT, EST, LEVL III, 20-29 MIN: ICD-10-PCS | Mod: ,,, | Performed by: NURSE PRACTITIONER

## 2023-07-07 PROCEDURE — 96372 PR INJECTION,THERAP/PROPH/DIAG2ST, IM OR SUBCUT: ICD-10-PCS | Mod: ,,, | Performed by: NURSE PRACTITIONER

## 2023-07-07 PROCEDURE — 99213 OFFICE O/P EST LOW 20 MIN: CPT | Mod: ,,, | Performed by: NURSE PRACTITIONER

## 2023-07-07 RX ORDER — DEXAMETHASONE SODIUM PHOSPHATE 4 MG/ML
6 INJECTION, SOLUTION INTRA-ARTICULAR; INTRALESIONAL; INTRAMUSCULAR; INTRAVENOUS; SOFT TISSUE
Status: COMPLETED | OUTPATIENT
Start: 2023-07-07 | End: 2023-07-07

## 2023-07-07 RX ADMIN — DEXAMETHASONE SODIUM PHOSPHATE 6 MG: 4 INJECTION, SOLUTION INTRA-ARTICULAR; INTRALESIONAL; INTRAMUSCULAR; INTRAVENOUS; SOFT TISSUE at 11:07

## 2023-07-07 NOTE — PROGRESS NOTES
PROMISE Richardson   [facility]      PATIENT NAME: Jesse Winston  : 1955  DATE: 23  MRN: [id number]      Patient PCP Information       Provider PCP Type    PROMISE Alonso General            Reason for Visit / Chief Complaint: Injections (Patient presents to the clinic for inflammation injection /Rm1/) and Pain (Pain left hip and muscle spasms in back. )         History of Present Illness / Problem Focused Workflow     Jesse Winston presents to the clinic with Injections (Patient presents to the clinic for inflammation injection /Rm1/) and Pain (Pain left hip and muscle spasms in back. )     Pain  Associated symptoms include arthralgias and numbness. Pertinent negatives include no abdominal pain, chest pain, chills, congestion, coughing, diaphoresis, fatigue, fever, headaches, joint swelling, myalgias, nausea, rash, sore throat or weakness.   Mr Winston was seen this am by Dr Abreu Chiropractor for recent mid thoracic back pain and left hip pain with radiation to left thigh. Reports burning pain and numbness to lateral left thigh, radiation from hip.   Patient reports muscles spasms to mid back, worse with turning. Patient reports XR of back and hip were obtained at Dr Garnett office.   Reports adjustments to both hips and back, as well as TENS tx to back performed. Antiinflammatory injection recommended as well.   Patient denies prior physical therapy. Denies chest pain or SOB.     Review of Systems     Review of Systems   Constitutional:  Negative for activity change, appetite change, chills, diaphoresis, fatigue, fever and unexpected weight change.   HENT:  Negative for congestion, ear pain, facial swelling, hearing loss, nosebleeds and sore throat.    Eyes: Negative.    Respiratory:  Negative for apnea, cough, shortness of breath and wheezing.    Cardiovascular:  Negative for chest pain, palpitations and leg swelling.   Gastrointestinal:  Negative for abdominal distention,  abdominal pain, blood in stool, constipation, diarrhea and nausea.   Endocrine: Negative for cold intolerance, heat intolerance, polydipsia, polyphagia and polyuria.   Genitourinary:  Negative for decreased urine volume, difficulty urinating, dysuria, flank pain, frequency, hematuria and urgency.   Musculoskeletal:  Positive for arthralgias and back pain. Negative for joint swelling and myalgias.   Skin:  Negative for color change and rash.   Allergic/Immunologic: Negative.    Neurological:  Positive for numbness. Negative for dizziness, tremors, seizures, syncope, facial asymmetry, speech difficulty, weakness, light-headedness and headaches.   Hematological:  Negative for adenopathy. Does not bruise/bleed easily.   Psychiatric/Behavioral:  Negative for behavioral problems and confusion.      Medical / Social / Family History     Past Medical History:   Diagnosis Date    Anxiety     Diabetes mellitus, type 2     Hyperlipidemia     Hypertension     Inguinal hernia, left 4/29/2021    Lipoma 10/26/2021    Obesity (BMI 30-39.9)     Personal history of COVID-19 4/29/2021    Umbilical hernia without obstruction and without gangrene 4/29/2021       Past Surgical History:   Procedure Laterality Date    COLONOSCOPY  10/07/2019    HERNIA REPAIR      umbilical & inguinal    LIPOMA RESECTION      removal per Dr. Daniel    REPAIR OF EYELID Bilateral        Social History    reports that he has never smoked. He has never been exposed to tobacco smoke. He has never used smokeless tobacco. He reports current alcohol use. He reports that he does not use drugs.    Family History  's family history includes Colon cancer in his father; Diabetes in his maternal grandmother; Hypertension in his mother; No Known Problems in his brother, daughter, daughter, maternal grandfather, paternal grandfather, and paternal grandmother; Sleep apnea in his brother and brother; Stroke in his mother; Thyroid disease in his mother.    Medications  and Allergies     Medications  Outpatient Medications Marked as Taking for the 7/7/23 encounter (Office Visit) with PROMISE Richardson   Medication Sig Dispense Refill    aspirin (ECOTRIN) 81 MG EC tablet Take 81 mg by mouth once daily.      atorvastatin (LIPITOR) 40 MG tablet Take 1 tablet (40 mg total) by mouth once daily. 90 tablet 3    b complex vitamins capsule Take 1 capsule by mouth once daily.      cetirizine (ZYRTEC) 10 MG tablet Take 10 mg by mouth once daily.      clorazepate (TRANXENE) 3.75 MG Tab Take 2 tablets (7.5 mg total) by mouth every 12 (twelve) hours as needed (anxiety). 1-2 tablets by mouth every 12 hours as needed for anxiety 90 tablet 2    diphenhydrAMINE (BENADRYL) 25 mg capsule Take 25 mg by mouth nightly as needed for Itching or Insomnia.      famotidine (PEPCID) 10 MG tablet Take 10 mg by mouth once daily.      folic acid/multivit-min/lutein (CENTRUM SILVER ORAL) Take 1 capsule by mouth once daily.      lactobacillus acidophilus & bulgar (LACTINEX) 100 million cell packet Take 1 tablet by mouth once daily.      losartan (COZAAR) 25 MG tablet Take 0.5 tablets (12.5 mg total) by mouth once daily. 45 tablet 1    melatonin 10 mg Cap Take 10 mg by mouth every evening.      metFORMIN (GLUCOPHAGE) 500 MG tablet Take 2 tablets (1,000 mg total) by mouth 2 (two) times daily with meals. 360 tablet 1    semaglutide (OZEMPIC) 0.25 mg or 0.5 mg (2 mg/3 mL) pen injector Inject 0.5 mg into the skin every 7 days. Start with 0.25 mg weekly x 4 weeks then 0.5 mg weekly 9 mL 3    tadalafiL (CIALIS) 5 MG tablet Take 1 tablet (5 mg total) by mouth once daily. 90 tablet 3    vitamin D (VITAMIN D3) 1000 units Tab Take 1,000 Units by mouth once daily.       Current Facility-Administered Medications for the 7/7/23 encounter (Office Visit) with PROMISE Richardson   Medication Dose Route Frequency Provider Last Rate Last Admin    dexAMETHasone injection 6 mg  6 mg Intramuscular 1 time in Clinic/HOD Isela HALL  "PROMISE Robbins           Allergies  Review of patient's allergies indicates:  No Known Allergies    Physical Examination     Vitals:    07/07/23 1049 07/07/23 1054   BP: 129/83    BP Location: Left arm    Patient Position: Sitting    BP Method: Medium (Automatic)    Pulse: 63    Resp: 18    Temp: 98 °F (36.7 °C)    TempSrc: Oral    SpO2: 95%  Comment: Patient states ever since he had covid his oxygen stayes lower. (!) 94%   Weight: 101.2 kg (223 lb 3.2 oz)    Height: 5' 9" (1.753 m)        Physical Exam  Vitals and nursing note reviewed.   Constitutional:       Appearance: Normal appearance. He is obese.   HENT:      Head: Normocephalic.      Right Ear: External ear normal.      Left Ear: External ear normal.      Nose: Nose normal.      Mouth/Throat:      Mouth: Mucous membranes are moist.   Eyes:      Extraocular Movements: Extraocular movements intact.   Cardiovascular:      Rate and Rhythm: Normal rate and regular rhythm.      Pulses: Normal pulses.      Heart sounds: Normal heart sounds. No murmur heard.  Pulmonary:      Effort: Pulmonary effort is normal.      Breath sounds: Normal breath sounds. No stridor. No wheezing or rhonchi.   Musculoskeletal:         General: Normal range of motion.      Cervical back: Normal range of motion.      Right lower leg: No edema.      Left lower leg: No edema.   Skin:     General: Skin is warm and dry.      Capillary Refill: Capillary refill takes less than 2 seconds.   Neurological:      General: No focal deficit present.      Mental Status: He is alert and oriented to person, place, and time. Mental status is at baseline.   Psychiatric:         Mood and Affect: Mood normal.         Behavior: Behavior normal.         Thought Content: Thought content normal.         Judgment: Judgment normal.         No visits with results within 14 Day(s) from this visit.   Latest known visit with results is:   Office Visit on 05/16/2023   Component Date Value Ref Range Status    Creatinine, " Urine 05/16/2023 115  39 - 259 mg/dL Final    Microalbumin 05/16/2023 0.6  0.0 - 2.8 mg/dL Final    Microalbumin/Creatinine Ratio 05/16/2023 5.2  0.0 - 30.0 mg/g Final    Barbiturates, Urine 05/16/2023 Negative  Negative Final    Benzodiazepine, Urine 05/16/2023 Positive (A)  Negative Final    Opiates, Urine 05/16/2023 Negative  Negative Final    Phencyclidine, Urine 05/16/2023 Negative  Negative Final    Amphetamine, Urine 05/16/2023 Negative  Negative Final    Cannabinoid, Urine 05/16/2023 Negative  Negative Final    Cocaine, Urine 05/16/2023 Negative  Negative Final      The results of screening tests should be considered presumptive. Confirmatory testing is available upon request.    Cutoff Points:  PCP:         25ng/mL  AMPH:        500ng/mL  RAVINDRA:        200ng/mL  ANIYAH:        200ng/mL  THC:         50ng/mL  JANET:         300ng/mL  OPI:         2000ng/mL             Assessment and Plan (including Health Maintenance)       Plan:   Acute midline thoracic back pain  -     dexAMETHasone injection 6 mg    Left hip pain  -     dexAMETHasone injection 6 mg    Radiculopathy, unspecified spinal region  -     dexAMETHasone injection 6 mg     RTC prn   There are no Patient Instructions on file for this visit.       Health Maintenance Due   Topic Date Due    COVID-19 Vaccine (1) Never done    Shingles Vaccine (2 of 3) 09/09/2013    TETANUS VACCINE  08/12/2020       Most Recent Immunizations   Administered Date(s) Administered    Influenza (FLUAD) - Quadrivalent - Adjuvanted - PF *Preferred* (65+) 10/20/2022    Influenza - Quadrivalent - High Dose - PF (65 years and older) 11/17/2021    Pneumococcal Conjugate - 13 Valent 07/31/2018    Pneumococcal Polysaccharide - 23 Valent 10/29/2019    Tdap 08/12/2010    Zoster 07/15/2013        Problem List Items Addressed This Visit    None  Visit Diagnoses       Acute midline thoracic back pain    -  Primary    Relevant Medications    dexAMETHasone injection 6 mg (Start on 7/7/2023  11:15 AM)    Left hip pain        Relevant Medications    dexAMETHasone injection 6 mg (Start on 7/7/2023 11:15 AM)    Radiculopathy, unspecified spinal region        Relevant Medications    dexAMETHasone injection 6 mg (Start on 7/7/2023 11:15 AM)            Health Maintenance Topics with due status: Not Due       Topic Last Completion Date    Colorectal Cancer Screening 10/07/2019    Influenza Vaccine 10/20/2022    Eye Exam 01/06/2023    PROSTATE-SPECIFIC ANTIGEN 01/31/2023    Lipid Panel 01/31/2023    Hemoglobin A1c 01/31/2023    Low Dose Statin 05/16/2023    Diabetes Urine Screening 05/16/2023    Foot Exam 05/16/2023       Future Appointments   Date Time Provider Department Center   8/30/2023  7:40 AM PROMISE Alonso [facility]   11/15/2023  9:00 AM SYEDA NURSEKIRK [facility] FAMILY MEDICINE [facility] J[facility] [place]            Signature:  PROMISE Richardson  [facility]     Date of encounter: 7/7/23

## 2023-08-02 ENCOUNTER — TELEPHONE (OUTPATIENT)
Dept: FAMILY MEDICINE | Facility: CLINIC | Age: 68
End: 2023-08-02
Payer: MEDICARE

## 2023-08-02 DIAGNOSIS — E11.9 TYPE 2 DIABETES MELLITUS WITHOUT COMPLICATION, WITHOUT LONG-TERM CURRENT USE OF INSULIN: Primary | Chronic | ICD-10-CM

## 2023-08-02 RX ORDER — SEMAGLUTIDE 1.34 MG/ML
1 INJECTION, SOLUTION SUBCUTANEOUS
Qty: 9 ML | Refills: 3 | Status: SHIPPED | OUTPATIENT
Start: 2023-08-02 | End: 2023-12-15 | Stop reason: SDUPTHER

## 2023-08-02 NOTE — TELEPHONE ENCOUNTER
----- Message from Chata Sutton sent at 8/2/2023  8:41 AM CDT -----  Pt need refill on OZEMPIC  something about he suppose to get a New dose  sent in to  Reg [facility] Pt # [phone]

## 2023-08-15 ENCOUNTER — PATIENT MESSAGE (OUTPATIENT)
Dept: ADMINISTRATIVE | Facility: HOSPITAL | Age: 68
End: 2023-08-15

## 2023-08-18 ENCOUNTER — PATIENT OUTREACH (OUTPATIENT)
Dept: ADMINISTRATIVE | Facility: HOSPITAL | Age: 68
End: 2023-08-18

## 2023-08-18 NOTE — PROGRESS NOTES
Pt replied to campaign portal message and wants to have his past due A1C. Has an appt on 8/30/2023 at 0740 with PCP. Comment placed in appt note that pt needs A1c at this visit. Portal message sent to patient letting him know that we will order A1C at next visit.

## 2023-08-30 ENCOUNTER — OFFICE VISIT (OUTPATIENT)
Dept: FAMILY MEDICINE | Facility: CLINIC | Age: 68
End: 2023-08-30
Payer: MEDICARE

## 2023-08-30 VITALS
HEART RATE: 76 BPM | BODY MASS INDEX: 33.18 KG/M2 | TEMPERATURE: 98 F | WEIGHT: 224 LBS | OXYGEN SATURATION: 98 % | RESPIRATION RATE: 20 BRPM | DIASTOLIC BLOOD PRESSURE: 76 MMHG | SYSTOLIC BLOOD PRESSURE: 116 MMHG | HEIGHT: 69 IN

## 2023-08-30 DIAGNOSIS — E78.2 MIXED HYPERLIPIDEMIA: Chronic | ICD-10-CM

## 2023-08-30 DIAGNOSIS — E11.9 TYPE 2 DIABETES MELLITUS WITHOUT COMPLICATION, WITHOUT LONG-TERM CURRENT USE OF INSULIN: Primary | Chronic | ICD-10-CM

## 2023-08-30 DIAGNOSIS — I10 ESSENTIAL HYPERTENSION: Chronic | ICD-10-CM

## 2023-08-30 DIAGNOSIS — Z79.899 ENCOUNTER FOR LONG-TERM (CURRENT) USE OF OTHER MEDICATIONS: ICD-10-CM

## 2023-08-30 DIAGNOSIS — F41.9 ANXIETY: Chronic | ICD-10-CM

## 2023-08-30 DIAGNOSIS — M54.32 SCIATICA, LEFT SIDE: ICD-10-CM

## 2023-08-30 LAB
ALBUMIN SERPL BCP-MCNC: 3.7 G/DL (ref 3.5–5)
ALBUMIN/GLOB SERPL: 1.3 {RATIO}
ALP SERPL-CCNC: 104 U/L (ref 45–115)
ALT SERPL W P-5'-P-CCNC: 40 U/L (ref 16–61)
ANION GAP SERPL CALCULATED.3IONS-SCNC: 11 MMOL/L (ref 7–16)
AST SERPL W P-5'-P-CCNC: 16 U/L (ref 15–37)
BASOPHILS # BLD AUTO: 0.03 K/UL (ref 0–0.2)
BASOPHILS NFR BLD AUTO: 0.6 % (ref 0–1)
BILIRUB SERPL-MCNC: 0.4 MG/DL (ref ?–1.2)
BUN SERPL-MCNC: 17 MG/DL (ref 7–18)
BUN/CREAT SERPL: 16 (ref 6–20)
CALCIUM SERPL-MCNC: 8.9 MG/DL (ref 8.5–10.1)
CHLORIDE SERPL-SCNC: 109 MMOL/L (ref 98–107)
CHOLEST SERPL-MCNC: 103 MG/DL (ref 0–200)
CHOLEST/HDLC SERPL: 3.6 {RATIO}
CO2 SERPL-SCNC: 25 MMOL/L (ref 21–32)
CREAT SERPL-MCNC: 1.04 MG/DL (ref 0.7–1.3)
DIFFERENTIAL METHOD BLD: ABNORMAL
EGFR (NO RACE VARIABLE) (RUSH/TITUS): 77 ML/MIN/1.73M2
EOSINOPHIL # BLD AUTO: 0.1 K/UL (ref 0–0.5)
EOSINOPHIL NFR BLD AUTO: 2.1 % (ref 1–4)
ERYTHROCYTE [DISTWIDTH] IN BLOOD BY AUTOMATED COUNT: 12.9 % (ref 11.5–14.5)
EST. AVERAGE GLUCOSE BLD GHB EST-MCNC: 107 MG/DL
GLOBULIN SER-MCNC: 2.8 G/DL (ref 2–4)
GLUCOSE SERPL-MCNC: 118 MG/DL (ref 74–106)
HBA1C MFR BLD HPLC: 5.8 % (ref 4.5–6.6)
HCT VFR BLD AUTO: 37.8 % (ref 40–54)
HDLC SERPL-MCNC: 29 MG/DL (ref 40–60)
HGB BLD-MCNC: 13.3 G/DL (ref 13.5–18)
IMM GRANULOCYTES # BLD AUTO: 0.01 K/UL (ref 0–0.04)
IMM GRANULOCYTES NFR BLD: 0.2 % (ref 0–0.4)
LDLC SERPL CALC-MCNC: 30 MG/DL
LDLC/HDLC SERPL: 1 {RATIO}
LYMPHOCYTES # BLD AUTO: 1.63 K/UL (ref 1–4.8)
LYMPHOCYTES NFR BLD AUTO: 34.4 % (ref 27–41)
MCH RBC QN AUTO: 30.9 PG (ref 27–31)
MCHC RBC AUTO-ENTMCNC: 35.2 G/DL (ref 32–36)
MCV RBC AUTO: 87.7 FL (ref 80–96)
MONOCYTES # BLD AUTO: 0.49 K/UL (ref 0–0.8)
MONOCYTES NFR BLD AUTO: 10.3 % (ref 2–6)
MPC BLD CALC-MCNC: 10.8 FL (ref 9.4–12.4)
NEUTROPHILS # BLD AUTO: 2.48 K/UL (ref 1.8–7.7)
NEUTROPHILS NFR BLD AUTO: 52.4 % (ref 53–65)
NONHDLC SERPL-MCNC: 74 MG/DL
NRBC # BLD AUTO: 0 X10E3/UL
NRBC, AUTO (.00): 0 %
PLATELET # BLD AUTO: 226 K/UL (ref 150–400)
POTASSIUM SERPL-SCNC: 3.8 MMOL/L (ref 3.5–5.1)
PROT SERPL-MCNC: 6.5 G/DL (ref 6.4–8.2)
RBC # BLD AUTO: 4.31 M/UL (ref 4.6–6.2)
SODIUM SERPL-SCNC: 141 MMOL/L (ref 136–145)
TRIGL SERPL-MCNC: 219 MG/DL (ref 35–150)
VLDLC SERPL-MCNC: 44 MG/DL
WBC # BLD AUTO: 4.74 K/UL (ref 4.5–11)

## 2023-08-30 PROCEDURE — 83036 HEMOGLOBIN GLYCOSYLATED A1C: CPT | Mod: ,,, | Performed by: CLINICAL MEDICAL LABORATORY

## 2023-08-30 PROCEDURE — 99214 PR OFFICE/OUTPT VISIT, EST, LEVL IV, 30-39 MIN: ICD-10-PCS | Mod: ,,, | Performed by: NURSE PRACTITIONER

## 2023-08-30 PROCEDURE — 85025 CBC WITH DIFFERENTIAL: ICD-10-PCS | Mod: ,,, | Performed by: CLINICAL MEDICAL LABORATORY

## 2023-08-30 PROCEDURE — 99214 OFFICE O/P EST MOD 30 MIN: CPT | Mod: ,,, | Performed by: NURSE PRACTITIONER

## 2023-08-30 PROCEDURE — 80053 COMPREHEN METABOLIC PANEL: CPT | Mod: ,,, | Performed by: CLINICAL MEDICAL LABORATORY

## 2023-08-30 PROCEDURE — 80053 COMPREHENSIVE METABOLIC PANEL: ICD-10-PCS | Mod: ,,, | Performed by: CLINICAL MEDICAL LABORATORY

## 2023-08-30 PROCEDURE — 85025 COMPLETE CBC W/AUTO DIFF WBC: CPT | Mod: ,,, | Performed by: CLINICAL MEDICAL LABORATORY

## 2023-08-30 PROCEDURE — 80061 LIPID PANEL: ICD-10-PCS | Mod: ,,, | Performed by: CLINICAL MEDICAL LABORATORY

## 2023-08-30 PROCEDURE — 83036 HEMOGLOBIN A1C: ICD-10-PCS | Mod: ,,, | Performed by: CLINICAL MEDICAL LABORATORY

## 2023-08-30 PROCEDURE — 80061 LIPID PANEL: CPT | Mod: ,,, | Performed by: CLINICAL MEDICAL LABORATORY

## 2023-08-30 RX ORDER — CLORAZEPATE DIPOTASSIUM 3.75 MG/1
7.5 TABLET ORAL EVERY 12 HOURS PRN
Qty: 90 TABLET | Refills: 2 | Status: SHIPPED | OUTPATIENT
Start: 2023-08-30 | End: 2023-12-11 | Stop reason: SDUPTHER

## 2023-08-30 RX ORDER — LOSARTAN POTASSIUM 25 MG/1
25 TABLET ORAL DAILY
Qty: 90 TABLET | Refills: 3 | Status: SHIPPED | OUTPATIENT
Start: 2023-08-30 | End: 2024-08-29

## 2023-08-30 RX ORDER — ATORVASTATIN CALCIUM 40 MG/1
40 TABLET, FILM COATED ORAL DAILY
Qty: 90 TABLET | Refills: 3 | Status: SHIPPED | OUTPATIENT
Start: 2023-08-30

## 2023-08-30 NOTE — PROGRESS NOTES
[facility] FAMILY MEDICINE       PATIENT NAME: Jesse Winston   : 1955    AGE: 68 y.o. DATE OF ENCOUNTER: 23    MRN: [id number]      PCP: Carmen Ng FNP    Reason for Visit / Chief Complaint:  Hypertension and Diabetes (Patient presents to the clinic for a 3m f/u htn dm and anxiety/Pt states he doesn't split BP pill in half.)         274}    Subjective:     HPI:    Presents for 3-mth f/u anxiety and benzo monitoring with med refill.  F/u T2DM - on max dose metformin w/ Ozempic added in May for gradually increasing A1c, tolerating 1 mg weekly.    Several weeks ago saw Chiropractor, Dr. Abreu, for left hip pain.  Had decadron 6 mg and didn't notice much benefit.  Still having numbness and radiating heat on outside of leg radiating down to knee.  LBP and muscle spasms has cleared up.     Review of Systems:   Review of Systems   Constitutional: Negative.    Respiratory: Negative.          On CPAP for SHEILA.   Cardiovascular: Negative.    Gastrointestinal: Negative.    Endocrine: Negative.    Genitourinary: Negative.    Musculoskeletal:  Negative for back pain.   Neurological:  Positive for numbness (left hip). Negative for dizziness and headaches.   Psychiatric/Behavioral:  Negative for sleep disturbance and suicidal ideas. Nervous/anxious: anxiety controlled with tranxene as needed for years.      Allergies and Meds: 274}   Review of patient's allergies indicates:  No Known Allergies     Current Outpatient Medications:     aspirin (ECOTRIN) 81 MG EC tablet, Take 81 mg by mouth once daily., Disp: , Rfl:     b complex vitamins capsule, Take 1 capsule by mouth once daily., Disp: , Rfl:     cetirizine (ZYRTEC) 10 MG tablet, Take 10 mg by mouth once daily., Disp: , Rfl:     diphenhydrAMINE (BENADRYL) 25 mg capsule, Take 25 mg by mouth nightly as needed for Itching or Insomnia., Disp: , Rfl:     famotidine (PEPCID) 10 MG tablet, Take 10 mg by mouth once daily., Disp: , Rfl:     folic  acid/multivit-min/lutein (CENTRUM SILVER ORAL), Take 1 capsule by mouth once daily., Disp: , Rfl:     lactobacillus acidophilus & bulgar (LACTINEX) 100 million cell packet, Take 1 tablet by mouth once daily., Disp: , Rfl:     melatonin 10 mg Cap, Take 10 mg by mouth every evening., Disp: , Rfl:     metFORMIN (GLUCOPHAGE) 500 MG tablet, Take 2 tablets (1,000 mg total) by mouth 2 (two) times daily with meals., Disp: 360 tablet, Rfl: 1    semaglutide (OZEMPIC) 1 mg/dose (4 mg/3 mL), Inject 1 mg into the skin every 7 days., Disp: 9 mL, Rfl: 3    tadalafiL (CIALIS) 5 MG tablet, Take 1 tablet (5 mg total) by mouth once daily., Disp: 90 tablet, Rfl: 3    vitamin D (VITAMIN D3) 1000 units Tab, Take 1,000 Units by mouth once daily., Disp: , Rfl:     atorvastatin (LIPITOR) 40 MG tablet, Take 1 tablet (40 mg total) by mouth once daily., Disp: 90 tablet, Rfl: 3    clorazepate (TRANXENE) 3.75 MG Tab, Take 2 tablets (7.5 mg total) by mouth every 12 (twelve) hours as needed (anxiety). 1-2 tablets by mouth every 12 hours as needed for anxiety, Disp: 90 tablet, Rfl: 2    losartan (COZAAR) 25 MG tablet, Take 1 tablet (25 mg total) by mouth once daily., Disp: 90 tablet, Rfl: 3    Labs:274}   I have reviewed labs below:  Lab Results   Component Value Date    WBC 5.61 01/31/2023    RBC 4.75 01/31/2023    HGB 14.2 01/31/2023    HCT 41.9 01/31/2023     01/31/2023     01/31/2023    K 4.5 01/31/2023     (H) 01/31/2023    CALCIUM 9.3 01/31/2023     (H) 01/31/2023    BUN 15 01/31/2023    CREATININE 1.11 01/31/2023    EGFRNONAA 66 07/13/2022    ALT 34 01/31/2023    AST 18 01/31/2023    CHOL 111 01/31/2023    TRIG 181 (H) 01/31/2023    HDL 33 (L) 01/31/2023    LDLCALC 42 01/31/2023    TSH 1.650 01/31/2023    PSA 0.594 01/31/2023    HGBA1C 6.4 01/31/2023    MICROALBUR 0.6 05/16/2023       Medical History: 274}     Past Medical History:   Diagnosis Date    Anxiety     Diabetes mellitus, type 2     Hyperlipidemia      "Hypertension     Inguinal hernia, left 4/29/2021    Lipoma 10/26/2021    Obesity (BMI 30-39.9)     Personal history of COVID-19 4/29/2021    Umbilical hernia without obstruction and without gangrene 4/29/2021      Social History     Tobacco Use   Smoking Status Never    Passive exposure: Never   Smokeless Tobacco Never      Past Surgical History:   Procedure Laterality Date    COLONOSCOPY  10/07/2019    HERNIA REPAIR      umbilical & inguinal    LIPOMA RESECTION      removal per Dr. Daniel    REPAIR OF EYELID Bilateral         Health Maintenance: 274}     Health Maintenance         Date Due Completion Date    COVID-19 Vaccine (1) Never done ---    Shingles Vaccine (2 of 3) 09/09/2013 7/15/2013    TETANUS VACCINE 08/12/2020 8/12/2010    Hemoglobin A1c 07/31/2023 1/31/2023    Influenza Vaccine (1) 09/01/2023 10/20/2022    Eye Exam 01/06/2024 1/6/2023 (Done)    Override on 1/6/2023: Done ([facility].)    Override on 12/28/2021: Done (Dr. Loya- Scanned into documents.)    Override on 12/21/2020: Done ([facility] Dr JANNET Loya. Scanned into documents.)    PROSTATE-SPECIFIC ANTIGEN 01/31/2024 1/31/2023    Override on 7/30/2020: Done    Lipid Panel 01/31/2024 1/31/2023    Override on 10/29/2020: Done    Diabetes Urine Screening 05/16/2024 5/16/2023    Foot Exam 05/16/2024 5/16/2023    Low Dose Statin 08/30/2024 8/30/2023    Colorectal Cancer Screening 10/07/2029 10/7/2019            Objective:  274}   /76 (BP Location: Right arm, Patient Position: Sitting, BP Method: Medium (Automatic))   Pulse 76   Temp 97.5 °F (36.4 °C) (Oral)   Resp 20   Ht 5' 9" (1.753 m)   Wt 101.6 kg (224 lb)   SpO2 98%   BMI 33.08 kg/m²     Wt Readings from Last 3 Encounters:   08/30/23 101.6 kg (224 lb)   07/07/23 101.2 kg (223 lb 3.2 oz)   05/16/23 104.3 kg (230 lb)     BP Readings from Last 3 Encounters:   08/30/23 116/76   07/07/23 129/83   05/16/23 125/82     Body mass index is 33.08 kg/m². "     Physical Exam  Vitals and nursing note reviewed.   Constitutional:       General: He is not in acute distress.     Appearance: Normal appearance.   HENT:      Head: Normocephalic.   Eyes:      Conjunctiva/sclera: Conjunctivae normal.   Neck:      Thyroid: No thyromegaly or thyroid tenderness.      Trachea: Trachea normal.   Cardiovascular:      Rate and Rhythm: Normal rate and regular rhythm.      Pulses: Normal pulses.      Heart sounds: Normal heart sounds.   Pulmonary:      Effort: Pulmonary effort is normal. No respiratory distress.      Breath sounds: Normal breath sounds.   Musculoskeletal:      Cervical back: Neck supple.      Right lower leg: No edema.      Left lower leg: No edema.   Lymphadenopathy:      Cervical: No cervical adenopathy.   Skin:     General: Skin is warm and dry.   Neurological:      Mental Status: He is alert and oriented to person, place, and time.   Psychiatric:         Mood and Affect: Mood normal.         Behavior: Behavior normal.        Assessment and Plan: 274}     1. Type 2 diabetes mellitus without complication, without long-term current use of insulin  Comments:  Controlled, check A1c today.    Continue metformin and Ozempic.  Orders:  -     Comprehensive Metabolic Panel; Future; Expected date: 08/30/2023  -     Hemoglobin A1C; Future; Expected date: 08/30/2023    2. Mixed hyperlipidemia  Comments:  Controlled, continue rosuvastatin.  Orders:  -     Comprehensive Metabolic Panel; Future; Expected date: 08/30/2023  -     Lipid Panel; Future; Expected date: 08/30/2023  -     atorvastatin (LIPITOR) 40 MG tablet; Take 1 tablet (40 mg total) by mouth once daily.  Dispense: 90 tablet; Refill: 3    3. Anxiety  Comments:   reviewed without suspicious activity  CSA on file, random UDS  Refill tranxene for p.r.n. use.  Orders:  -     POCT Urine Drug Screen Presump  -     clorazepate (TRANXENE) 3.75 MG Tab; Take 2 tablets (7.5 mg total) by mouth every 12 (twelve) hours as needed  (anxiety). 1-2 tablets by mouth every 12 hours as needed for anxiety  Dispense: 90 tablet; Refill: 2    4. Encounter for long-term (current) use of other medications  -     CBC Auto Differential; Future; Expected date: 08/30/2023  -     Comprehensive Metabolic Panel; Future; Expected date: 08/30/2023  -     POCT Urine Drug Screen Presump    5. Essential hypertension  Comments:  BP well controlled, on Arb for renal protection  Orders:  -     losartan (COZAAR) 25 MG tablet; Take 1 tablet (25 mg total) by mouth once daily.  Dispense: 90 tablet; Refill: 3    6. Sciatica, left side  Comments:  Paresthesias only now left hip down to left knee, denies pain.  Advised f/u if pain returns or has increasing paresthesias or weakness of legs.       Advised to check on getting Shingrix and TDAP at local pharmacy due to Medicare doesn't cover these vaccines in office.    Return to clinic 3-mth f/u anxiety/benzo monitoring, nonfasting; and sooner as needed.    Future Appointments   Date Time Provider Department Center   11/15/2023  9:00 AM AWMICHEAL NURSE, [facility] MEDIC[facility] JAN Crandall   12/11/2023  1:20 PM Carmen Ng FNP [facility] [place]        Signature:  PROMISE Alonso

## 2023-09-05 DIAGNOSIS — D64.9 MILD ANEMIA: Primary | ICD-10-CM

## 2023-09-05 DIAGNOSIS — Z79.899 ENCOUNTER FOR LONG-TERM (CURRENT) USE OF OTHER MEDICATIONS: ICD-10-CM

## 2023-12-09 DIAGNOSIS — Z71.89 COMPLEX CARE COORDINATION: ICD-10-CM

## 2023-12-11 ENCOUNTER — OFFICE VISIT (OUTPATIENT)
Dept: FAMILY MEDICINE | Facility: CLINIC | Age: 68
End: 2023-12-11
Payer: MEDICARE

## 2023-12-11 VITALS
TEMPERATURE: 98 F | BODY MASS INDEX: 33.47 KG/M2 | OXYGEN SATURATION: 99 % | HEART RATE: 70 BPM | WEIGHT: 226 LBS | HEIGHT: 69 IN | RESPIRATION RATE: 20 BRPM | DIASTOLIC BLOOD PRESSURE: 74 MMHG | SYSTOLIC BLOOD PRESSURE: 131 MMHG

## 2023-12-11 DIAGNOSIS — Z23 FLU VACCINE NEED: ICD-10-CM

## 2023-12-11 DIAGNOSIS — Z79.899 ENCOUNTER FOR LONG-TERM (CURRENT) USE OF OTHER MEDICATIONS: ICD-10-CM

## 2023-12-11 DIAGNOSIS — F41.9 ANXIETY: Primary | Chronic | ICD-10-CM

## 2023-12-11 PROCEDURE — G0008 ADMIN INFLUENZA VIRUS VAC: HCPCS | Mod: ,,, | Performed by: NURSE PRACTITIONER

## 2023-12-11 PROCEDURE — G0008 FLU VACCINE - QUADRIVALENT - ADJUVANTED: ICD-10-PCS | Mod: ,,, | Performed by: NURSE PRACTITIONER

## 2023-12-11 PROCEDURE — 90694 FLU VACCINE - QUADRIVALENT - ADJUVANTED: ICD-10-PCS | Mod: ,,, | Performed by: NURSE PRACTITIONER

## 2023-12-11 PROCEDURE — 99213 PR OFFICE/OUTPT VISIT, EST, LEVL III, 20-29 MIN: ICD-10-PCS | Mod: ,,, | Performed by: NURSE PRACTITIONER

## 2023-12-11 PROCEDURE — 90694 VACC AIIV4 NO PRSRV 0.5ML IM: CPT | Mod: ,,, | Performed by: NURSE PRACTITIONER

## 2023-12-11 PROCEDURE — 99213 OFFICE O/P EST LOW 20 MIN: CPT | Mod: ,,, | Performed by: NURSE PRACTITIONER

## 2023-12-11 RX ORDER — GLUCOSAM/CHONDRO/HERB 149/HYAL 750-100 MG
1 TABLET ORAL DAILY
COMMUNITY

## 2023-12-11 RX ORDER — CLORAZEPATE DIPOTASSIUM 3.75 MG/1
7.5 TABLET ORAL EVERY 12 HOURS PRN
Qty: 90 TABLET | Refills: 2 | Status: SHIPPED | OUTPATIENT
Start: 2023-12-11 | End: 2024-03-13 | Stop reason: SDUPTHER

## 2023-12-11 NOTE — PROGRESS NOTES
[facility] FAMILY MEDICINE       PATIENT NAME: Jesse Winston   : 1955    AGE: 68 y.o. DATE OF ENCOUNTER: 23    MRN: [id number]      PCP: Carmen Ng FNP    Reason for Visit / Chief Complaint:  Follow-up and Anxiety (Patient presents to the clinic for 3m anxiety)         274}    Subjective:     HPI:    Presents for 3 mth f/u anxiety, benzo monitoring w/ med refill.  On low dose tranxene many years, well controlled. .    Review of Systems:   Review of Systems   Constitutional: Negative.    Respiratory: Negative.          On CPAP for SHEILA.   Cardiovascular: Negative.    Gastrointestinal: Negative.    Endocrine: Negative.    Genitourinary: Negative.    Musculoskeletal:  Negative for back pain.   Neurological: Negative.    Psychiatric/Behavioral:  Negative for sleep disturbance and suicidal ideas. Nervous/anxious: anxiety controlled with tranxene as needed for years.        Allergies and Meds: 274}   Review of patient's allergies indicates:  No Known Allergies     Current Outpatient Medications:     aspirin (ECOTRIN) 81 MG EC tablet, Take 81 mg by mouth once daily., Disp: , Rfl:     atorvastatin (LIPITOR) 40 MG tablet, Take 1 tablet (40 mg total) by mouth once daily., Disp: 90 tablet, Rfl: 3    b complex vitamins capsule, Take 1 capsule by mouth once daily., Disp: , Rfl:     cetirizine (ZYRTEC) 10 MG tablet, Take 10 mg by mouth once daily., Disp: , Rfl:     diphenhydrAMINE (BENADRYL) 25 mg capsule, Take 25 mg by mouth nightly as needed for Itching or Insomnia., Disp: , Rfl:     famotidine (PEPCID) 10 MG tablet, Take 10 mg by mouth once daily., Disp: , Rfl:     folic acid/multivit-min/lutein (CENTRUM SILVER ORAL), Take 1 capsule by mouth once daily., Disp: , Rfl:     lactobacillus acidophilus & bulgar (LACTINEX) 100 million cell packet, Take 1 tablet by mouth once daily., Disp: , Rfl:     losartan (COZAAR) 25 MG tablet, Take 1 tablet (25 mg total) by mouth once daily., Disp: 90  tablet, Rfl: 3    melatonin 10 mg Cap, Take 10 mg by mouth every evening., Disp: , Rfl:     metFORMIN (GLUCOPHAGE) 500 MG tablet, Take 2 tablets (1,000 mg total) by mouth 2 (two) times daily with meals., Disp: 360 tablet, Rfl: 1    omega 3-dha-epa-fish oil (FISH OIL) 1,000 mg (120 mg-180 mg) Cap, Take 1 capsule by mouth Daily., Disp: , Rfl:     semaglutide (OZEMPIC) 1 mg/dose (4 mg/3 mL), Inject 1 mg into the skin every 7 days., Disp: 9 mL, Rfl: 3    tadalafiL (CIALIS) 5 MG tablet, Take 1 tablet (5 mg total) by mouth once daily., Disp: 90 tablet, Rfl: 3    vitamin D (VITAMIN D3) 1000 units Tab, Take 1,000 Units by mouth once daily., Disp: , Rfl:     clorazepate (TRANXENE) 3.75 MG Tab, Take 2 tablets (7.5 mg total) by mouth every 12 (twelve) hours as needed (anxiety). 1-2 tablets by mouth every 12 hours as needed for anxiety, Disp: 90 tablet, Rfl: 2    Labs:274}   I have reviewed labs below:  Lab Results   Component Value Date    WBC 5.30 09/13/2023    RBC 4.54 (L) 09/13/2023    HGB 14.0 09/13/2023    HCT 39.8 (L) 09/13/2023     09/13/2023     08/30/2023    K 3.8 08/30/2023     (H) 08/30/2023    CALCIUM 8.9 08/30/2023     (H) 08/30/2023    BUN 17 08/30/2023    CREATININE 1.04 08/30/2023    EGFRNONAA 66 07/13/2022    ALT 40 08/30/2023    AST 16 08/30/2023    CHOL 103 08/30/2023    TRIG 219 (H) 08/30/2023    HDL 29 (L) 08/30/2023    LDLCALC 30 08/30/2023    TSH 1.650 01/31/2023    PSA 0.594 01/31/2023    HGBA1C 5.8 08/30/2023    MICROALBUR 0.6 05/16/2023       Medical History: 274}     Past Medical History:   Diagnosis Date    Anxiety     Diabetes mellitus, type 2     Hyperlipidemia     Hypertension     Inguinal hernia, left 4/29/2021    Lipoma 10/26/2021    Obesity (BMI 30-39.9)     Personal history of COVID-19 4/29/2021    Umbilical hernia without obstruction and without gangrene 4/29/2021      Social History     Tobacco Use   Smoking Status Never    Passive exposure: Never   Smokeless  "Tobacco Never      Past Surgical History:   Procedure Laterality Date    COLONOSCOPY  10/07/2019    HERNIA REPAIR      umbilical & inguinal    LIPOMA RESECTION      removal per Dr. Daniel    REPAIR OF EYELID Bilateral         Health Maintenance: 274}     Health Maintenance         Date Due Completion Date    RSV Vaccine (Age 60+ and Pregnant patients) (1 - 1-dose 60+ series) Never done ---    Shingles Vaccine (2 of 3) 09/09/2013 7/15/2013    TETANUS VACCINE 08/12/2020 8/12/2010    Eye Exam 01/06/2024 1/6/2023 (Done)    Override on 1/6/2023: Done ([facility]    Override on 12/28/2021: Done (Dr. Loya- Scanned into documents.)    Override on 12/21/2020: Done ([facility] Dr JANNET Loya. Scanned into documents.)    PROSTATE-SPECIFIC ANTIGEN 01/31/2024 1/31/2023    Override on 7/30/2020: Done    Hemoglobin A1c 02/29/2024 8/30/2023    Diabetes Urine Screening 05/16/2024 5/16/2023    Foot Exam 05/16/2024 5/16/2023    Lipid Panel 08/30/2024 8/30/2023    Override on 10/29/2020: Done    Low Dose Statin 12/11/2024 12/11/2023    Colorectal Cancer Screening 10/07/2029 10/7/2019          Immunization History   Administered Date(s) Administered    Influenza (FLUAD) - Quadrivalent - Adjuvanted - PF *Preferred* (65+) 10/20/2022, 12/11/2023    Influenza - High Dose - PF (65 years and older) 10/17/2020    Influenza - Quadrivalent - High Dose - PF (65 years and older) 11/17/2021    Pneumococcal Conjugate - 13 Valent 07/31/2018    Pneumococcal Polysaccharide - 23 Valent 10/29/2019    Tdap 08/12/2010    Zoster 07/15/2013     Objective:  274}   /74 (BP Location: Right arm, Patient Position: Sitting, BP Method: Large (Automatic))   Pulse 70   Temp 98.1 °F (36.7 °C) (Oral)   Resp 20   Ht 5' 9" (1.753 m)   Wt 102.5 kg (226 lb)   SpO2 99%   BMI 33.37 kg/m²     Wt Readings from Last 3 Encounters:   12/11/23 102.5 kg (226 lb)   08/30/23 101.6 kg (224 lb)   07/07/23 101.2 kg (223 lb 3.2 oz)     BP " Readings from Last 3 Encounters:   12/11/23 131/74   08/30/23 116/76   07/07/23 129/83     Body mass index is 33.37 kg/m².     Physical Exam  Vitals and nursing note reviewed.   Constitutional:       General: He is not in acute distress.     Appearance: Normal appearance. He is not ill-appearing.   HENT:      Head: Normocephalic.   Eyes:      Conjunctiva/sclera: Conjunctivae normal.   Cardiovascular:      Rate and Rhythm: Normal rate and regular rhythm.      Heart sounds: Normal heart sounds.   Pulmonary:      Effort: Pulmonary effort is normal. No respiratory distress.      Breath sounds: Normal breath sounds.   Skin:     General: Skin is warm and dry.   Neurological:      Mental Status: He is alert and oriented to person, place, and time.         Assessment and Plan: 274}     1. Anxiety  Comments:   reviewed without suspicious activity  CSA on file, random UDS  Refill tranxene for p.r.n. use.  Orders:  -     clorazepate (TRANXENE) 3.75 MG Tab; Take 2 tablets (7.5 mg total) by mouth every 12 (twelve) hours as needed (anxiety). 1-2 tablets by mouth every 12 hours as needed for anxiety  Dispense: 90 tablet; Refill: 2  -     POCT Urine Drug Screen Presump    2. Encounter for long-term (current) use of other medications  -     POCT Urine Drug Screen Presump    3. Flu vaccine need  -     Influenza (FLUAD) - Quadrivalent (Adjuvanted) *Preferred* (65+) (PF)       Accidentally wasted urine for POCT, no past suspicious behavior or inconsistent screens, will get random UDS next visit.    Return to clinic 3-mth f/u anxiety, T2DM, and HLD with fasting labs; and sooner as needed.    Future Appointments   Date Time Provider Department Center   1/11/2024  8:00 AM AWV NURSE, [facility] MEDIC[facility] J[facility] [place]   3/13/2024 11:00 AM Carmen Ng FNP [facility] [place]        Signature:  PROMISE Alonso

## 2023-12-15 DIAGNOSIS — E11.9 TYPE 2 DIABETES MELLITUS WITHOUT COMPLICATION, WITHOUT LONG-TERM CURRENT USE OF INSULIN: Chronic | ICD-10-CM

## 2023-12-15 RX ORDER — SEMAGLUTIDE 1.34 MG/ML
1 INJECTION, SOLUTION SUBCUTANEOUS
Qty: 9 ML | Refills: 3 | Status: SHIPPED | OUTPATIENT
Start: 2023-12-15 | End: 2024-12-14

## 2023-12-15 NOTE — TELEPHONE ENCOUNTER
Prescription has run out for ozempic patient states, has changed pharmacy to [facility]. Thank you

## 2024-01-03 LAB
LEFT EYE DM RETINOPATHY: NEGATIVE
RIGHT EYE DM RETINOPATHY: NEGATIVE

## 2024-01-04 NOTE — PROGRESS NOTES
"   [facility] [place]       PATIENT NAME: Jesse Winston   : 1955    AGE: 68 y.o. DATE OF ENCOUNTER: 24    MRN: [id number]      Jesse Winston presented for a  Medicare AWV and comprehensive Health Risk Assessment today. The following components were reviewed and updated:    Medical history  Family History  Social history  Allergies and Current Medications  Health Risk Assessment  Health Maintenance  Care Team         ** See Completed Assessments for Annual Wellness Visit within the encounter summary.**         The following assessments were completed:  Living Situation  CAGE  Depression Screening  Timed Get Up and Go  Whisper Test  Cognitive Function Screening  Nutrition Screening  ADL Screening  PAQ Screening        Vitals:    24 0819   BP: 118/68   BP Location: Right arm   Patient Position: Sitting   Pulse: 66   Resp: 16   Temp: 98.1 °F (36.7 °C)   TempSrc: Oral   SpO2: 97%   Weight: 103.4 kg (228 lb)   Height: 5' 9.5" (1.765 m)     Body mass index is 33.19 kg/m².  Physical Exam  Vitals and nursing note reviewed.   Constitutional:       General: He is not in acute distress.     Appearance: Normal appearance. He is not ill-appearing.   HENT:      Head: Normocephalic.   Eyes:      Conjunctiva/sclera: Conjunctivae normal.   Cardiovascular:      Rate and Rhythm: Normal rate and regular rhythm.      Heart sounds: Normal heart sounds.   Pulmonary:      Effort: Pulmonary effort is normal. No respiratory distress.      Breath sounds: Normal breath sounds.   Skin:     General: Skin is warm and dry.      Coloration: Skin is not jaundiced or pale.   Neurological:      Mental Status: He is alert and oriented to person, place, and time.      Gait: Gait normal.   Psychiatric:         Mood and Affect: Mood normal.         Behavior: Behavior normal.         Thought Content: Thought content normal.         Judgment: Judgment normal.               Diagnoses and health risks identified " today and associated recommendations/orders:    1. Encounter for subsequent annual wellness visit (AWV) in Medicare patient    2. Essential hypertension  Comments:  Well controlled, continue losartan 25 mg daily.    3. Type 2 diabetes mellitus without complication, without long-term current use of insulin  Assessment & Plan:  Lab Results   Component Value Date    HGBA1C 5.8 08/30/2023     Very well controlled, continue Ozempic and metformin.      4. Mixed hyperlipidemia  Comments:  Well controlled, continue atorvastatin 40 mg daily.    5. Anxiety  Comments:  Controlled, on Tranxene for years.  Risk associated with changing medication does not outweigh perceived benefit.    6. SHEILA (obstructive sleep apnea)  Comments:  Controlled, on CPAP.  Followed by sleep medicine.    7. Need for vaccination  -     diphth,pertus,acell,,tetanus (BOOSTRIX) 2.5-8-5 Lf-mcg-Lf/0.5mL Susp; Inject 0.5 mLs into the muscle once. for 1 dose  Dispense: 0.5 mL; Refill: 0    8. BMI 33.0-33.9,adult    9. Obesity, Class I, BMI 30-34.9  Assessment & Plan:  Body mass index is 33.19 kg/m². Morbid obesity complicates all aspects of disease management from diagnostic modalities to treatment. Weight loss encouraged and health benefits explained to patient.       10. Mild intermittent asthma without complication  Overview:  Per Pulmolonologist Dr. Katz's notes. Mild RAD noted on PFTs.    Assessment & Plan:  Stable, mild, no medication required.         Provided Jesse with a 5-10 year written screening schedule and personal prevention plan. Recommendations were developed using the USPSTF age appropriate recommendations. Education, counseling, and referrals were provided as needed. After Visit Summary printed and given to patient which includes a list of additional screenings\tests needed.    Follow up in about 1 year (around 1/11/2025).    PROMISE Alonso    Tetanus vaccine - order sent to pharmacy, Shingles vaccine - declined, Eye exam -  VAN faxed to Dr. Loya, RSV vaccine - VIS(10/19/2023) given with instructions to take at pharmacy  Covid vaccine - declined.    I offered to discuss advanced care planning, including how to pick a person who would make decisions for you if you were unable to make them for yourself, called a health care power of , and what kind of decisions you might make such as use of life sustaining treatments such as ventilators and tube feeding when faced with a life limiting illness recorded on a living will that they will need to know. (How you want to be cared for as you near the end of your natural life)     X Patient is interested in learning more about how to make advanced directives.  I provided them paperwork and offered to discuss this with them.

## 2024-01-11 ENCOUNTER — OFFICE VISIT (OUTPATIENT)
Dept: FAMILY MEDICINE | Facility: CLINIC | Age: 69
End: 2024-01-11
Payer: MEDICARE

## 2024-01-11 VITALS
DIASTOLIC BLOOD PRESSURE: 68 MMHG | HEIGHT: 70 IN | SYSTOLIC BLOOD PRESSURE: 118 MMHG | RESPIRATION RATE: 16 BRPM | OXYGEN SATURATION: 97 % | WEIGHT: 228 LBS | BODY MASS INDEX: 32.64 KG/M2 | HEART RATE: 66 BPM | TEMPERATURE: 98 F

## 2024-01-11 DIAGNOSIS — E66.9 OBESITY, CLASS I, BMI 30-34.9: Chronic | ICD-10-CM

## 2024-01-11 DIAGNOSIS — Z00.00 ENCOUNTER FOR SUBSEQUENT ANNUAL WELLNESS VISIT (AWV) IN MEDICARE PATIENT: Primary | ICD-10-CM

## 2024-01-11 DIAGNOSIS — F41.9 ANXIETY: Chronic | ICD-10-CM

## 2024-01-11 DIAGNOSIS — G47.33 OSA (OBSTRUCTIVE SLEEP APNEA): Chronic | ICD-10-CM

## 2024-01-11 DIAGNOSIS — E78.2 MIXED HYPERLIPIDEMIA: Chronic | ICD-10-CM

## 2024-01-11 DIAGNOSIS — J45.20 MILD INTERMITTENT ASTHMA WITHOUT COMPLICATION: Chronic | ICD-10-CM

## 2024-01-11 DIAGNOSIS — Z23 NEED FOR VACCINATION: ICD-10-CM

## 2024-01-11 DIAGNOSIS — E11.9 TYPE 2 DIABETES MELLITUS WITHOUT COMPLICATION, WITHOUT LONG-TERM CURRENT USE OF INSULIN: Chronic | ICD-10-CM

## 2024-01-11 DIAGNOSIS — I10 ESSENTIAL HYPERTENSION: Chronic | ICD-10-CM

## 2024-01-11 PROCEDURE — G0439 PPPS, SUBSEQ VISIT: HCPCS | Mod: ,,, | Performed by: NURSE PRACTITIONER

## 2024-01-11 NOTE — LETTER
AUTHORIZATION FOR RELEASE OF   CONFIDENTIAL INFORMATION    Dear [facility],    We are seeing Jesse Winston, date of birth 1955, in the clinic at [facility]. Carmen Ng FNP is the patient's PCP. Jesse Winston has an outstanding lab/procedure at the time we reviewed his chart. In order to help keep his health information updated, he has authorized us to request the following medical record(s):        (  )  MAMMOGRAM                                      (  )  COLONOSCOPY      (  )  PAP SMEAR                                          (  )  OUTSIDE LAB RESULTS     (  )  DEXA SCAN                                          ( x )  EYE EXAM            (  )  FOOT EXAM                                          (  )  ENTIRE RECORD     (  )  OUTSIDE IMMUNIZATIONS                 (  )  _______________         Please fax records to [facility], Lafferty, Jennifer P, FNP, [phone]     If you have any questions, please contact  at [phone].           Patient Name: Jesse Winston  : 1955  Patient Phone #: [phone]

## 2024-01-11 NOTE — PATIENT INSTRUCTIONS
Counseling and Referral of Other Preventative  (Italic type indicates deductible and co-insurance are waived)    Patient Name: Jesse Winston  Today's Date: 1/11/2024    Health Maintenance       Date Due Completion Date    RSV Vaccine (Age 60+ and Pregnant patients) (1 - 1-dose 60+ series) Never done ---    Shingles Vaccine (2 of 3) 09/09/2013 7/15/2013    TETANUS VACCINE 08/12/2020 8/12/2010    Eye Exam 01/06/2024 1/6/2023 (Done)    Override on 1/6/2023: Done ([facility].)    Override on 12/28/2021: Done (Dr. Loya- Scanned into documents.)    Override on 12/21/2020: Done ([facility] Dr JANNET Loya. Scanned into documents.)    PROSTATE-SPECIFIC ANTIGEN 01/31/2024 1/31/2023    Override on 7/30/2020: Done    Hemoglobin A1c 02/29/2024 8/30/2023    Diabetes Urine Screening 05/16/2024 5/16/2023    Foot Exam 05/16/2024 5/16/2023    Lipid Panel 08/30/2024 8/30/2023    Override on 10/29/2020: Done    High Dose Statin 12/11/2024 12/11/2023    Colorectal Cancer Screening 10/07/2029 10/7/2019        No orders of the defined types were placed in this encounter.      The following information is provided to all patients.  This information is to help you find resources for any of the problems found today that may be affecting your health:                  Living healthy guide: ms.gov    Understanding Diabetes: www.diabetes.org      Eating healthy: www.cdc.gov/healthyweight      CDC home safety checklist: www.cdc.gov/steadi/patient.html      Agency on Aging: ms.gov    Alcoholics anonymous (AA): www.aa.org      Physical Activity: www.elana.nih.gov/fr7wawc      Tobacco use: ms.gov

## 2024-01-12 NOTE — ASSESSMENT & PLAN NOTE
Lab Results   Component Value Date    HGBA1C 5.8 08/30/2023     Very well controlled, continue Ozempic and metformin.

## 2024-01-12 NOTE — ASSESSMENT & PLAN NOTE
Body mass index is 33.19 kg/m². Morbid obesity complicates all aspects of disease management from diagnostic modalities to treatment. Weight loss encouraged and health benefits explained to patient.

## 2024-01-19 ENCOUNTER — PATIENT OUTREACH (OUTPATIENT)
Dept: ADMINISTRATIVE | Facility: HOSPITAL | Age: 69
End: 2024-01-19

## 2024-01-19 NOTE — PROGRESS NOTES
Population Health Chart Review & Patient Outreach Details    Updates Requested / Reviewed:  [x]  Care Team Updated    Health Maintenance Topics Addressed and Outreach Outcomes / Actions Taken:  Diabetic Eye Exam         [x] HM Updated with January 2024 Eye Exam (Dr. Loya). History Updated.

## 2024-03-11 ENCOUNTER — TELEPHONE (OUTPATIENT)
Dept: FAMILY MEDICINE | Facility: CLINIC | Age: 69
End: 2024-03-11
Payer: MEDICARE

## 2024-03-11 DIAGNOSIS — F41.9 ANXIETY: Chronic | ICD-10-CM

## 2024-03-11 DIAGNOSIS — Z79.899 ENCOUNTER FOR LONG-TERM (CURRENT) USE OF OTHER MEDICATIONS: ICD-10-CM

## 2024-03-11 DIAGNOSIS — Z12.5 PROSTATE CANCER SCREENING: ICD-10-CM

## 2024-03-11 DIAGNOSIS — E78.2 MIXED HYPERLIPIDEMIA: Chronic | ICD-10-CM

## 2024-03-11 DIAGNOSIS — E11.9 TYPE 2 DIABETES MELLITUS WITHOUT COMPLICATION, WITHOUT LONG-TERM CURRENT USE OF INSULIN: Chronic | ICD-10-CM

## 2024-03-11 DIAGNOSIS — E11.9 TYPE 2 DIABETES MELLITUS WITHOUT COMPLICATION, WITHOUT LONG-TERM CURRENT USE OF INSULIN: Primary | Chronic | ICD-10-CM

## 2024-03-11 RX ORDER — METFORMIN HYDROCHLORIDE 500 MG/1
1000 TABLET ORAL 2 TIMES DAILY
Qty: 360 TABLET | Refills: 3 | Status: SHIPPED | OUTPATIENT
Start: 2024-03-11 | End: 2025-03-11

## 2024-03-11 NOTE — TELEPHONE ENCOUNTER
----- Message from Chata Sutton sent at 3/11/2024  4:26 PM CDT -----  Pt wanting to get labs done in morning and is it time to get PSA done ?

## 2024-03-13 ENCOUNTER — LAB VISIT (OUTPATIENT)
Dept: LAB | Facility: CLINIC | Age: 69
End: 2024-03-13
Payer: MEDICARE

## 2024-03-13 ENCOUNTER — OFFICE VISIT (OUTPATIENT)
Dept: FAMILY MEDICINE | Facility: CLINIC | Age: 69
End: 2024-03-13
Payer: MEDICARE

## 2024-03-13 VITALS
WEIGHT: 222 LBS | HEIGHT: 70 IN | DIASTOLIC BLOOD PRESSURE: 79 MMHG | RESPIRATION RATE: 18 BRPM | SYSTOLIC BLOOD PRESSURE: 117 MMHG | OXYGEN SATURATION: 98 % | HEART RATE: 67 BPM | TEMPERATURE: 98 F | BODY MASS INDEX: 31.78 KG/M2

## 2024-03-13 DIAGNOSIS — E66.9 OBESITY, CLASS I, BMI 30-34.9: Chronic | ICD-10-CM

## 2024-03-13 DIAGNOSIS — N40.1 BENIGN PROSTATIC HYPERPLASIA WITH NOCTURIA: Chronic | ICD-10-CM

## 2024-03-13 DIAGNOSIS — R35.1 BENIGN PROSTATIC HYPERPLASIA WITH NOCTURIA: Chronic | ICD-10-CM

## 2024-03-13 DIAGNOSIS — E11.9 TYPE 2 DIABETES MELLITUS WITHOUT COMPLICATION, WITHOUT LONG-TERM CURRENT USE OF INSULIN: Chronic | ICD-10-CM

## 2024-03-13 DIAGNOSIS — Z12.5 PROSTATE CANCER SCREENING: ICD-10-CM

## 2024-03-13 DIAGNOSIS — I10 ESSENTIAL HYPERTENSION: Chronic | ICD-10-CM

## 2024-03-13 DIAGNOSIS — F41.9 ANXIETY: Primary | Chronic | ICD-10-CM

## 2024-03-13 DIAGNOSIS — F41.9 ANXIETY: Chronic | ICD-10-CM

## 2024-03-13 DIAGNOSIS — Z79.899 ENCOUNTER FOR LONG-TERM (CURRENT) USE OF OTHER MEDICATIONS: ICD-10-CM

## 2024-03-13 DIAGNOSIS — E78.2 MIXED HYPERLIPIDEMIA: Chronic | ICD-10-CM

## 2024-03-13 DIAGNOSIS — E11.9 TYPE 2 DIABETES MELLITUS WITHOUT COMPLICATION, WITHOUT LONG-TERM CURRENT USE OF INSULIN: Primary | Chronic | ICD-10-CM

## 2024-03-13 LAB
ALBUMIN SERPL BCP-MCNC: 3.8 G/DL (ref 3.5–5)
ALBUMIN/GLOB SERPL: 1.3 {RATIO}
ALP SERPL-CCNC: 102 U/L (ref 45–115)
ALT SERPL W P-5'-P-CCNC: 43 U/L (ref 16–61)
ANION GAP SERPL CALCULATED.3IONS-SCNC: 10 MMOL/L (ref 7–16)
AST SERPL W P-5'-P-CCNC: 11 U/L (ref 15–37)
BASOPHILS # BLD AUTO: 0.03 K/UL (ref 0–0.2)
BASOPHILS NFR BLD AUTO: 0.4 % (ref 0–1)
BILIRUB SERPL-MCNC: 0.6 MG/DL (ref ?–1.2)
BUN SERPL-MCNC: 19 MG/DL (ref 7–18)
BUN/CREAT SERPL: 17 (ref 6–20)
CALCIUM SERPL-MCNC: 9.3 MG/DL (ref 8.5–10.1)
CHLORIDE SERPL-SCNC: 109 MMOL/L (ref 98–107)
CHOLEST SERPL-MCNC: 83 MG/DL (ref 0–200)
CHOLEST/HDLC SERPL: 2.7 {RATIO}
CO2 SERPL-SCNC: 25 MMOL/L (ref 21–32)
CREAT SERPL-MCNC: 1.13 MG/DL (ref 0.7–1.3)
CTP QC/QA: YES
DIFFERENTIAL METHOD BLD: ABNORMAL
EGFR (NO RACE VARIABLE) (RUSH/TITUS): 70 ML/MIN/1.73M2
EOSINOPHIL # BLD AUTO: 0.05 K/UL (ref 0–0.5)
EOSINOPHIL NFR BLD AUTO: 0.7 % (ref 1–4)
ERYTHROCYTE [DISTWIDTH] IN BLOOD BY AUTOMATED COUNT: 12.8 % (ref 11.5–14.5)
GLOBULIN SER-MCNC: 2.9 G/DL (ref 2–4)
GLUCOSE SERPL-MCNC: 127 MG/DL (ref 74–106)
HCT VFR BLD AUTO: 41.9 % (ref 40–54)
HDLC SERPL-MCNC: 31 MG/DL (ref 40–60)
HGB BLD-MCNC: 14.4 G/DL (ref 13.5–18)
IMM GRANULOCYTES # BLD AUTO: 0.01 K/UL (ref 0–0.04)
IMM GRANULOCYTES NFR BLD: 0.1 % (ref 0–0.4)
LDLC SERPL CALC-MCNC: 28 MG/DL
LYMPHOCYTES # BLD AUTO: 1.85 K/UL (ref 1–4.8)
LYMPHOCYTES NFR BLD AUTO: 27 % (ref 27–41)
MCH RBC QN AUTO: 30.8 PG (ref 27–31)
MCHC RBC AUTO-ENTMCNC: 34.4 G/DL (ref 32–36)
MCV RBC AUTO: 89.5 FL (ref 80–96)
MONOCYTES # BLD AUTO: 0.53 K/UL (ref 0–0.8)
MONOCYTES NFR BLD AUTO: 7.7 % (ref 2–6)
MPC BLD CALC-MCNC: 10.4 FL (ref 9.4–12.4)
NEUTROPHILS # BLD AUTO: 4.38 K/UL (ref 1.8–7.7)
NEUTROPHILS NFR BLD AUTO: 64.1 % (ref 53–65)
NONHDLC SERPL-MCNC: 52 MG/DL
NRBC # BLD AUTO: 0 X10E3/UL
NRBC, AUTO (.00): 0 %
PLATELET # BLD AUTO: 225 K/UL (ref 150–400)
POC (AMP) AMPHETAMINE: NEGATIVE
POC (BAR) BARBITURATES: NEGATIVE
POC (BUP) BUPRENORPHINE: NEGATIVE
POC (BZO) BENZODIAZEPINES: ABNORMAL
POC (COC) COCAINE: NEGATIVE
POC (MDMA) METHYLENEDIOXYMETHAMPHETAMINE 3,4: NEGATIVE
POC (MET) METHAMPHETAMINE: NEGATIVE
POC (MOP) OPIATES: NEGATIVE
POC (MTD) METHADONE: NEGATIVE
POC (OXY) OXYCODONE: NEGATIVE
POC (PCP) PHENCYCLIDINE: NEGATIVE
POC (TCA) TRICYCLIC ANTIDEPRESSANTS: NEGATIVE
POC TEMPERATURE (URINE): 94
POC THC: NEGATIVE
POTASSIUM SERPL-SCNC: 4.1 MMOL/L (ref 3.5–5.1)
PROT SERPL-MCNC: 6.7 G/DL (ref 6.4–8.2)
PSA SERPL-MCNC: 0.73 NG/ML
RBC # BLD AUTO: 4.68 M/UL (ref 4.6–6.2)
SODIUM SERPL-SCNC: 140 MMOL/L (ref 136–145)
TRIGL SERPL-MCNC: 122 MG/DL (ref 35–150)
TSH SERPL DL<=0.005 MIU/L-ACNC: 1.46 UIU/ML (ref 0.36–3.74)
VLDLC SERPL-MCNC: 24 MG/DL
WBC # BLD AUTO: 6.85 K/UL (ref 4.5–11)

## 2024-03-13 PROCEDURE — 85025 COMPLETE CBC W/AUTO DIFF WBC: CPT | Mod: ,,, | Performed by: CLINICAL MEDICAL LABORATORY

## 2024-03-13 PROCEDURE — 80053 COMPREHEN METABOLIC PANEL: CPT | Mod: ,,, | Performed by: CLINICAL MEDICAL LABORATORY

## 2024-03-13 PROCEDURE — 83036 HEMOGLOBIN GLYCOSYLATED A1C: CPT | Mod: ,,, | Performed by: CLINICAL MEDICAL LABORATORY

## 2024-03-13 PROCEDURE — G0103 PSA SCREENING: HCPCS | Mod: ,,, | Performed by: CLINICAL MEDICAL LABORATORY

## 2024-03-13 PROCEDURE — 84443 ASSAY THYROID STIM HORMONE: CPT | Mod: ,,, | Performed by: CLINICAL MEDICAL LABORATORY

## 2024-03-13 PROCEDURE — 80061 LIPID PANEL: CPT | Mod: ,,, | Performed by: CLINICAL MEDICAL LABORATORY

## 2024-03-13 PROCEDURE — 80305 DRUG TEST PRSMV DIR OPT OBS: CPT | Mod: RHCUB | Performed by: NURSE PRACTITIONER

## 2024-03-13 PROCEDURE — 99214 OFFICE O/P EST MOD 30 MIN: CPT | Mod: ,,, | Performed by: NURSE PRACTITIONER

## 2024-03-13 RX ORDER — ATORVASTATIN CALCIUM 80 MG/1
80 TABLET, FILM COATED ORAL DAILY
COMMUNITY
End: 2024-06-17

## 2024-03-13 RX ORDER — CLORAZEPATE DIPOTASSIUM 3.75 MG/1
7.5 TABLET ORAL EVERY 12 HOURS PRN
Qty: 90 TABLET | Refills: 2 | Status: SHIPPED | OUTPATIENT
Start: 2024-03-13 | End: 2024-05-16

## 2024-03-13 RX ORDER — TADALAFIL 5 MG/1
5 TABLET ORAL DAILY
Qty: 90 TABLET | Refills: 3 | Status: SHIPPED | OUTPATIENT
Start: 2024-03-13 | End: 2025-03-13

## 2024-03-13 NOTE — PROGRESS NOTES
[facility]       PATIENT NAME: Jesse Winston   : 1955    AGE: 68 y.o. DATE OF ENCOUNTER: 3/13/24    MRN: [id number]      PCP: Carmen Ng FNP    Reason for Visit / Chief Complaint:  Hypertension, Hyperlipidemia, and Follow-up (Patient presents to clinic for 3 month follow up of HTN and HLD)         274}    Subjective:     HPI:    Presents for 3 mth f/u anxiety & benzo monitoring with med refill.  F/u T2DM, HTN & mixed HLD.  Had fasting labs this am.  Hx mild anemia 23, hands cold all the time, anemia better 23.    Dr. Farris said atorvastatin 40 mg is sufficient control for his lipids and 80 mg could be counterproductive, but has been taking 80 mg daily since he picked up his last prescription 2024..    Sleep #s are askew with CPAP lately showing more events.    Review of Systems:   Review of Systems   Constitutional: Negative.    Respiratory: Negative.          On CPAP for SHEILA.   Cardiovascular: Negative.    Gastrointestinal: Negative.    Endocrine: Negative.    Genitourinary: Negative.    Musculoskeletal:  Negative for back pain.   Neurological: Negative.    Psychiatric/Behavioral:  Negative for sleep disturbance and suicidal ideas. Nervous/anxious: anxiety controlled with tranxene as needed for years.        Allergies and Meds: 274}   Review of patient's allergies indicates:  No Known Allergies     Current Outpatient Medications:     aspirin (ECOTRIN) 81 MG EC tablet, Take 81 mg by mouth once daily., Disp: , Rfl:     atorvastatin (LIPITOR) 80 MG tablet, Take 80 mg by mouth once daily., Disp: , Rfl:     b complex vitamins capsule, Take 1 capsule by mouth once daily., Disp: , Rfl:     cetirizine (ZYRTEC) 10 MG tablet, Take 10 mg by mouth once daily., Disp: , Rfl:     diphenhydrAMINE (BENADRYL) 25 mg capsule, Take 25 mg by mouth nightly as needed for Itching or Insomnia., Disp: , Rfl:     famotidine (PEPCID) 10 MG tablet, Take 10 mg by mouth once  daily., Disp: , Rfl:     folic acid/multivit-min/lutein (CENTRUM SILVER ORAL), Take 1 capsule by mouth once daily., Disp: , Rfl:     lactobacillus acidophilus & bulgar (LACTINEX) 100 million cell packet, Take 1 tablet by mouth once daily., Disp: , Rfl:     losartan (COZAAR) 25 MG tablet, Take 1 tablet (25 mg total) by mouth once daily., Disp: 90 tablet, Rfl: 3    melatonin 10 mg Cap, Take 10 mg by mouth every evening., Disp: , Rfl:     metFORMIN (GLUCOPHAGE) 500 MG tablet, Take 2 tablets (1,000 mg total) by mouth 2 (two) times daily., Disp: 360 tablet, Rfl: 3    omega 3-dha-epa-fish oil (FISH OIL) 1,000 mg (120 mg-180 mg) Cap, Take 1 capsule by mouth Daily., Disp: , Rfl:     semaglutide (OZEMPIC) 1 mg/dose (4 mg/3 mL), Inject 1 mg into the skin every 7 days., Disp: 9 mL, Rfl: 3    vitamin D (VITAMIN D3) 1000 units Tab, Take 1,000 Units by mouth once daily., Disp: , Rfl:     atorvastatin (LIPITOR) 40 MG tablet, Take 1 tablet (40 mg total) by mouth once daily. (Patient not taking: Reported on 3/13/2024), Disp: 90 tablet, Rfl: 3    clorazepate (TRANXENE) 3.75 MG Tab, Take 2 tablets (7.5 mg total) by mouth every 12 (twelve) hours as needed (anxiety). 1-2 tablets by mouth every 12 hours as needed for anxiety, Disp: 90 tablet, Rfl: 2    tadalafiL (CIALIS) 5 MG tablet, Take 1 tablet (5 mg total) by mouth once daily., Disp: 90 tablet, Rfl: 3    Labs:274}   I have reviewed labs below:  Lab Results   Component Value Date    WBC 5.30 09/13/2023    RBC 4.54 (L) 09/13/2023    HGB 14.0 09/13/2023    HCT 39.8 (L) 09/13/2023     09/13/2023     08/30/2023    K 3.8 08/30/2023     (H) 08/30/2023    CALCIUM 8.9 08/30/2023     (H) 08/30/2023    BUN 17 08/30/2023    CREATININE 1.04 08/30/2023    EGFRNONAA 66 07/13/2022    ALT 40 08/30/2023    AST 16 08/30/2023    CHOL 103 08/30/2023    TRIG 219 (H) 08/30/2023    HDL 29 (L) 08/30/2023    LDLCALC 30 08/30/2023    TSH 1.650 01/31/2023    PSA 0.594 01/31/2023     HGBA1C 5.8 08/30/2023    MICROALBUR 0.6 05/16/2023       Medical History: 274}     Past Medical History:   Diagnosis Date    Anxiety     Diabetes mellitus, type 2     Diabetic eye exam 01/03/2024    Dr. Asad Loay MD - [facility]    Hyperlipidemia     Hypertension     Inguinal hernia, left 04/29/2021    Lipoma 10/26/2021    Obesity (BMI 30-39.9)     Personal history of COVID-19 04/29/2021    Umbilical hernia without obstruction and without gangrene 04/29/2021      Social History     Tobacco Use   Smoking Status Never    Passive exposure: Never   Smokeless Tobacco Never      Past Surgical History:   Procedure Laterality Date    COLONOSCOPY  10/07/2019    HERNIA REPAIR      umbilical & inguinal    LIPOMA RESECTION      removal per Dr. Daniel    REPAIR OF EYELID Bilateral         Health Maintenance: 274}     Health Maintenance         Date Due Completion Date    RSV Vaccine (Age 60+ and Pregnant patients) (1 - 1-dose 60+ series) Never done ---    Shingles Vaccine (2 of 3) 09/09/2013 7/15/2013    TETANUS VACCINE 08/12/2020 8/12/2010    Diabetes Urine Screening 05/16/2024 5/16/2023    Foot Exam 05/16/2024 5/16/2023    Hemoglobin A1c 09/13/2024 3/13/2024    Eye Exam 01/03/2025 1/3/2024    PROSTATE-SPECIFIC ANTIGEN 03/13/2025 3/13/2024    Override on 7/30/2020: Done    Lipid Panel 03/13/2025 3/13/2024    Override on 10/29/2020: Done    Colorectal Cancer Screening 10/07/2029 10/7/2019          Immunization History   Administered Date(s) Administered    Influenza (FLUAD) - Quadrivalent - Adjuvanted - PF *Preferred* (65+) 10/20/2022, 12/11/2023    Influenza - High Dose - PF (65 years and older) 10/17/2020    Influenza - Quadrivalent - High Dose - PF (65 years and older) 11/17/2021    Pneumococcal Conjugate - 13 Valent 07/31/2018    Pneumococcal Polysaccharide - 23 Valent 10/29/2019    Tdap 08/12/2010    Zoster 07/15/2013     Objective:  274}   /79 (BP Location: Right arm, Patient Position:  "Sitting, BP Method: Medium (Automatic))   Pulse 67   Temp 98 °F (36.7 °C) (Oral)   Resp 18   Ht 5' 9.5" (1.765 m)   Wt 100.7 kg (222 lb)   SpO2 98%   BMI 32.31 kg/m²     Wt Readings from Last 3 Encounters:   03/13/24 100.7 kg (222 lb)   01/11/24 103.4 kg (228 lb)   12/11/23 102.5 kg (226 lb)     BP Readings from Last 3 Encounters:   03/13/24 117/79   01/11/24 118/68   12/11/23 131/74     Body mass index is 32.31 kg/m².     Physical Exam  Vitals and nursing note reviewed.   Constitutional:       General: He is not in acute distress.     Appearance: Normal appearance.   HENT:      Head: Normocephalic.      Right Ear: Tympanic membrane, ear canal and external ear normal.      Left Ear: Tympanic membrane, ear canal and external ear normal.      Nose: Nose normal.      Mouth/Throat:      Mouth: Mucous membranes are moist.      Pharynx: Oropharynx is clear. Uvula midline. No posterior oropharyngeal erythema or uvula swelling.   Eyes:      Conjunctiva/sclera: Conjunctivae normal.      Pupils: Pupils are equal, round, and reactive to light.   Neck:      Thyroid: No thyromegaly.      Vascular: Normal carotid pulses. No carotid bruit.   Cardiovascular:      Rate and Rhythm: Normal rate and regular rhythm.      Pulses: Normal pulses.      Heart sounds: Normal heart sounds.   Pulmonary:      Effort: Pulmonary effort is normal. No respiratory distress.      Breath sounds: Normal breath sounds.   Musculoskeletal:      Cervical back: Neck supple.      Right lower leg: No edema.      Left lower leg: No edema.   Lymphadenopathy:      Cervical: No cervical adenopathy.   Skin:     General: Skin is warm and dry.      Capillary Refill: Capillary refill takes less than 2 seconds.   Neurological:      General: No focal deficit present.      Mental Status: He is alert and oriented to person, place, and time.   Psychiatric:         Mood and Affect: Mood normal.         Behavior: Behavior normal.          Assessment and Plan: 274} "     1. Type 2 diabetes mellitus without complication, without long-term current use of insulin  Comments:  Well controlled, continue metformin and Ozempic.    2. Anxiety  Comments:   reviewed without suspicious activity  CSA on file, random UDS  Refill tranxene for p.r.n. use.  Orders:  -     clorazepate (TRANXENE) 3.75 MG Tab; Take 2 tablets (7.5 mg total) by mouth every 12 (twelve) hours as needed (anxiety). 1-2 tablets by mouth every 12 hours as needed for anxiety  Dispense: 90 tablet; Refill: 2    3. Benign prostatic hyperplasia with nocturia  Overview:  Controlled with daily Cialis    Orders:  -     tadalafiL (CIALIS) 5 MG tablet; Take 1 tablet (5 mg total) by mouth once daily.  Dispense: 90 tablet; Refill: 3    4. Obesity, Class I, BMI 30-34.9  Comments:  Is down 16 lbs in the past year.  Assessment & Plan:  Advised of the need for healthy diet, regular exercise, and weight control.       5. Essential hypertension  Comments:  Well controlled, continue current treatment.       Advised to check with sleep lab regarding CPAP issues.  We will notify when labs reviewed but will likely be the 1st of next week.    Return to clinic 3 mths f/u anxiety, benzo monitoring and med refill; and sooner as needed.    Future Appointments   Date Time Provider Department Center   6/17/2024  9:20 AM Carmen Ng FNP [facility]   1/16/2025  8:00 AM AWV NURSE, [facility] [place]        Signature:  PROMISE Alonso

## 2024-03-14 LAB
EST. AVERAGE GLUCOSE BLD GHB EST-MCNC: 126 MG/DL
HBA1C MFR BLD HPLC: 6 % (ref 4.5–6.6)

## 2024-06-17 ENCOUNTER — OFFICE VISIT (OUTPATIENT)
Dept: FAMILY MEDICINE | Facility: CLINIC | Age: 69
End: 2024-06-17
Payer: MEDICARE

## 2024-06-17 ENCOUNTER — PATIENT OUTREACH (OUTPATIENT)
Facility: HOSPITAL | Age: 69
End: 2024-06-17
Payer: MEDICARE

## 2024-06-17 VITALS
BODY MASS INDEX: 31.47 KG/M2 | DIASTOLIC BLOOD PRESSURE: 70 MMHG | OXYGEN SATURATION: 95 % | RESPIRATION RATE: 18 BRPM | WEIGHT: 219.81 LBS | SYSTOLIC BLOOD PRESSURE: 103 MMHG | HEIGHT: 70 IN | TEMPERATURE: 98 F | HEART RATE: 74 BPM

## 2024-06-17 DIAGNOSIS — E11.9 TYPE 2 DIABETES MELLITUS WITHOUT COMPLICATION, WITHOUT LONG-TERM CURRENT USE OF INSULIN: Chronic | ICD-10-CM

## 2024-06-17 DIAGNOSIS — I10 ESSENTIAL HYPERTENSION: Chronic | ICD-10-CM

## 2024-06-17 DIAGNOSIS — Z79.899 ENCOUNTER FOR LONG-TERM (CURRENT) USE OF OTHER MEDICATIONS: ICD-10-CM

## 2024-06-17 DIAGNOSIS — E78.2 MIXED HYPERLIPIDEMIA: Chronic | ICD-10-CM

## 2024-06-17 DIAGNOSIS — F41.9 ANXIETY: Primary | Chronic | ICD-10-CM

## 2024-06-17 LAB
ALBUMIN SERPL BCP-MCNC: 3.8 G/DL (ref 3.5–5)
ALBUMIN/GLOB SERPL: 1.2 {RATIO}
ALP SERPL-CCNC: 103 U/L (ref 45–115)
ALT SERPL W P-5'-P-CCNC: 42 U/L (ref 16–61)
ANION GAP SERPL CALCULATED.3IONS-SCNC: 11 MMOL/L (ref 7–16)
AST SERPL W P-5'-P-CCNC: 20 U/L (ref 15–37)
BILIRUB SERPL-MCNC: 0.3 MG/DL (ref ?–1.2)
BUN SERPL-MCNC: 18 MG/DL (ref 7–18)
BUN/CREAT SERPL: 17 (ref 6–20)
CALCIUM SERPL-MCNC: 9.3 MG/DL (ref 8.5–10.1)
CHLORIDE SERPL-SCNC: 106 MMOL/L (ref 98–107)
CHOLEST SERPL-MCNC: 106 MG/DL (ref 0–200)
CHOLEST/HDLC SERPL: 3.2 {RATIO}
CO2 SERPL-SCNC: 25 MMOL/L (ref 21–32)
CREAT SERPL-MCNC: 1.08 MG/DL (ref 0.7–1.3)
CREAT UR-MCNC: 218 MG/DL (ref 39–259)
EGFR (NO RACE VARIABLE) (RUSH/TITUS): 73 ML/MIN/1.73M2
GLOBULIN SER-MCNC: 3.3 G/DL (ref 2–4)
GLUCOSE SERPL-MCNC: 119 MG/DL (ref 74–106)
HDLC SERPL-MCNC: 33 MG/DL (ref 40–60)
LDLC SERPL CALC-MCNC: 43 MG/DL
LDLC/HDLC SERPL: 1.3 {RATIO}
MICROALBUMIN UR-MCNC: 0.7 MG/DL (ref 0–2.8)
MICROALBUMIN/CREAT RATIO PNL UR: 3.2 MG/G (ref 0–30)
NONHDLC SERPL-MCNC: 73 MG/DL
POTASSIUM SERPL-SCNC: 4.2 MMOL/L (ref 3.5–5.1)
PROT SERPL-MCNC: 7.1 G/DL (ref 6.4–8.2)
SODIUM SERPL-SCNC: 138 MMOL/L (ref 136–145)
TRIGL SERPL-MCNC: 152 MG/DL (ref 35–150)
VLDLC SERPL-MCNC: 30 MG/DL

## 2024-06-17 PROCEDURE — 99214 OFFICE O/P EST MOD 30 MIN: CPT | Mod: ,,, | Performed by: NURSE PRACTITIONER

## 2024-06-17 PROCEDURE — 82570 ASSAY OF URINE CREATININE: CPT | Mod: ,,, | Performed by: CLINICAL MEDICAL LABORATORY

## 2024-06-17 PROCEDURE — 80061 LIPID PANEL: CPT | Mod: ,,, | Performed by: CLINICAL MEDICAL LABORATORY

## 2024-06-17 PROCEDURE — 82043 UR ALBUMIN QUANTITATIVE: CPT | Mod: ,,, | Performed by: CLINICAL MEDICAL LABORATORY

## 2024-06-17 PROCEDURE — 80053 COMPREHEN METABOLIC PANEL: CPT | Mod: ,,, | Performed by: CLINICAL MEDICAL LABORATORY

## 2024-06-17 RX ORDER — CLORAZEPATE DIPOTASSIUM 3.75 MG/1
7.5 TABLET ORAL 2 TIMES DAILY
Qty: 90 TABLET | Refills: 0 | Status: CANCELLED | OUTPATIENT
Start: 2024-06-17

## 2024-06-17 RX ORDER — ATORVASTATIN CALCIUM 40 MG/1
40 TABLET, FILM COATED ORAL DAILY
Qty: 90 TABLET | Refills: 3 | Status: CANCELLED | OUTPATIENT
Start: 2024-06-17

## 2024-06-17 RX ORDER — LOSARTAN POTASSIUM 25 MG/1
25 TABLET ORAL DAILY
Qty: 90 TABLET | Refills: 3 | Status: SHIPPED | OUTPATIENT
Start: 2024-06-17 | End: 2025-06-17

## 2024-06-17 RX ORDER — CLORAZEPATE DIPOTASSIUM 7.5 MG/1
7.5 TABLET ORAL NIGHTLY
Qty: 45 TABLET | Refills: 0 | Status: SHIPPED | OUTPATIENT
Start: 2024-06-17

## 2024-06-17 NOTE — PROGRESS NOTES
[facility]       PATIENT NAME: Jesse Winston   : 1955    AGE: 69 y.o. DATE OF ENCOUNTER: 24    MRN: [id number]      PCP: Carmen Ng FNP    Subjective:     Reason for Visit / Chief Complaint:     274}  Chief Complaint   Patient presents with    Anxiety     Patient reports to the clinic today for 3 month follow up.    Health Maintenance     Care gaps addressed, patient would like to discuss shingles vaccine and foot exam.    Chyna Coburn CMA       HPI:    Presents for 3 mth f/u anxiety and mixed HLD.  Is fasting to recheck lipids past dose reduction after last check. Has been taking 1/2 tab atorvastatin 80 mg.    Review of Systems:     Review of Systems   Constitutional: Negative.    Respiratory: Negative.          On CPAP for SHEILA.   Cardiovascular: Negative.    Gastrointestinal: Negative.    Endocrine: Negative.    Genitourinary: Negative.    Musculoskeletal:  Negative for back pain.   Neurological: Negative.    Psychiatric/Behavioral:  Negative for sleep disturbance and suicidal ideas. Nervous/anxious: anxiety controlled with tranxene as needed for years.        Allergies and Meds: 274}     Review of patient's allergies indicates:  No Known Allergies     Current Outpatient Medications   Medication Sig Dispense Refill    aspirin (ECOTRIN) 81 MG EC tablet Take 81 mg by mouth once daily.      atorvastatin (LIPITOR) 40 MG tablet Take 1 tablet (40 mg total) by mouth once daily. 90 tablet 3    b complex vitamins capsule Take 1 capsule by mouth once daily.      cetirizine (ZYRTEC) 10 MG tablet Take 10 mg by mouth once daily.      famotidine (PEPCID) 10 MG tablet Take 10 mg by mouth once daily.      folic acid/multivit-min/lutein (CENTRUM SILVER ORAL) Take 1 capsule by mouth once daily.      lactobacillus acidophilus & bulgar (LACTINEX) 100 million cell packet Take 1 tablet by mouth once daily.      melatonin 10 mg Cap Take 10 mg by mouth every evening.       metFORMIN (GLUCOPHAGE) 500 MG tablet Take 2 tablets (1,000 mg total) by mouth 2 (two) times daily. 360 tablet 3    omega 3-dha-epa-fish oil (FISH OIL) 1,000 mg (120 mg-180 mg) Cap Take 1 capsule by mouth Daily.      semaglutide (OZEMPIC) 1 mg/dose (4 mg/3 mL) Inject 1 mg into the skin every 7 days. 9 mL 3    tadalafiL (CIALIS) 5 MG tablet Take 1 tablet (5 mg total) by mouth once daily. 90 tablet 3    vitamin D (VITAMIN D3) 1000 units Tab Take 1,000 Units by mouth once daily.      clorazepate (TRANXENE) 7.5 MG Tab Take 1 tablet (7.5 mg total) by mouth nightly. 45 tablet 0    losartan (COZAAR) 25 MG tablet Take 1 tablet (25 mg total) by mouth once daily. 90 tablet 3     No current facility-administered medications for this visit.       Labs:274}   I have reviewed labs below:    Lab Results   Component Value Date    WBC 6.85 03/13/2024    RBC 4.68 03/13/2024    HGB 14.4 03/13/2024    HCT 41.9 03/13/2024     03/13/2024     03/13/2024    K 4.1 03/13/2024     (H) 03/13/2024    CALCIUM 9.3 03/13/2024     (H) 03/13/2024    BUN 19 (H) 03/13/2024    CREATININE 1.13 03/13/2024    EGFRNONAA 66 07/13/2022    ALT 43 03/13/2024    AST 11 (L) 03/13/2024    CHOL 83 03/13/2024    TRIG 122 03/13/2024    HDL 31 (L) 03/13/2024    LDLCALC 28 03/13/2024    TSH 1.460 03/13/2024    PSA 0.734 03/13/2024    HGBA1C 6.0 03/13/2024    MICROALBUR 0.6 05/16/2023     Medical History: 274}     Past Medical History:   Diagnosis Date    Anxiety     Diabetes mellitus, type 2     Diabetic eye exam 01/03/2024    Dr. Asad Loya MD - [facility]    Hyperlipidemia     Hypertension     Inguinal hernia, left 04/29/2021    Lipoma 10/26/2021    Obesity (BMI 30-39.9)     Personal history of COVID-19 04/29/2021    Umbilical hernia without obstruction and without gangrene 04/29/2021      Social History     Tobacco Use   Smoking Status Never    Passive exposure: Never   Smokeless Tobacco Never      Past Surgical  "History:   Procedure Laterality Date    COLONOSCOPY  10/07/2019    HERNIA REPAIR      umbilical & inguinal    LIPOMA RESECTION      removal per Dr. Daniel    REPAIR OF EYELID Bilateral       Objective:  274}   Vital Signs  Temp: 97.9 °F (36.6 °C)  Temp Source: Oral  Pulse: 74  Resp: 18  SpO2: 95 %  BP: 103/70  BP Location: Right arm  Patient Position: Sitting  Pain Score: 0-No pain  Height and Weight  Height: 5' 9.5" (176.5 cm)  Weight: 99.7 kg (219 lb 12.8 oz)  BSA (Calculated - sq m): 2.21 sq meters  BMI (Calculated): 32  Weight in (lb) to have BMI = 25: 171.4    Over the last two weeks how often have you been bothered by little interest or pleasure in doing things: 0  Over the last two weeks how often have you been bothered by feeling down, depressed or hopeless: 0  PHQ-2 Total Score: 0  PHQ-9 Score: 0  PHQ-9 Interpretation: Minimal or None    Wt Readings from Last 3 Encounters:   06/17/24 99.7 kg (219 lb 12.8 oz)   03/13/24 100.7 kg (222 lb)   01/11/24 103.4 kg (228 lb)     Physical Exam  Vitals and nursing note reviewed.   Constitutional:       General: He is not in acute distress.     Appearance: Normal appearance.   HENT:      Head: Normocephalic.   Eyes:      Conjunctiva/sclera: Conjunctivae normal.      Pupils: Pupils are equal, round, and reactive to light.   Neck:      Thyroid: No thyromegaly.      Vascular: No carotid bruit.      Trachea: Trachea normal.   Cardiovascular:      Rate and Rhythm: Normal rate and regular rhythm.      Pulses:           Dorsalis pedis pulses are 3+ on the right side and 3+ on the left side.        Posterior tibial pulses are 3+ on the right side and 3+ on the left side.      Heart sounds: Normal heart sounds.   Pulmonary:      Effort: Pulmonary effort is normal. No respiratory distress.      Breath sounds: Normal breath sounds. No wheezing, rhonchi or rales.   Musculoskeletal:      Cervical back: Neck supple.      Right lower leg: No edema.      Left lower leg: No edema.      " Right foot: Normal range of motion. No deformity or bunion.      Left foot: Normal range of motion. No deformity or bunion.   Feet:      Right foot:      Protective Sensation: 6 sites tested.  6 sites sensed.      Skin integrity: Skin integrity normal. No ulcer, blister, erythema, warmth or callus.      Toenail Condition: Right toenails are normal.      Left foot:      Protective Sensation: 6 sites tested.  6 sites sensed.      Skin integrity: Skin integrity normal. No ulcer, blister, erythema, warmth or callus.      Toenail Condition: Left toenails are normal.   Lymphadenopathy:      Cervical: No cervical adenopathy.      Upper Body:      Right upper body: No supraclavicular adenopathy.      Left upper body: No supraclavicular adenopathy.   Skin:     General: Skin is warm and dry.      Findings: No rash.   Neurological:      General: No focal deficit present.      Mental Status: He is alert and oriented to person, place, and time.   Psychiatric:         Mood and Affect: Mood normal.         Behavior: Behavior normal.        Assessment and Plan: 274}     1. Anxiety  Assessment & Plan:  MS  report reviewed via Epic with no suspicious activity noted.  Controlled substance agreement on file.  Random UDS today.  Med refill to pharmacy, change from 3.75 mg to 7.5 mg tab due to taking 2 3.75 mg tabs nightly for many years.    Orders:  -     POCT Urine Drug Screen Presump  -     clorazepate (TRANXENE) 7.5 MG Tab; Take 1 tablet (7.5 mg total) by mouth nightly.  Dispense: 45 tablet; Refill: 0    2. Encounter for long-term (current) use of other medications  -     POCT Urine Drug Screen Presump  -     Comprehensive Metabolic Panel; Future; Expected date: 06/17/2024    3. Type 2 diabetes mellitus without complication, without long-term current use of insulin  Assessment & Plan:  Lab Results   Component Value Date    HGBA1C 6.0 03/13/2024     Continue metformin and Ozempic.  Update diabetes urine screening  today.    Orders:  -     Microalbumin/Creatinine Ratio, Urine; Future; Expected date: 06/17/2024    4. Mixed hyperlipidemia  Assessment & Plan:  Was advised by his cardiologist that only 40 mg atorvastatin daily needed and 80 mg could be counter productive; however triglycerides and HDL were improved on last labs while taking 80 mg.  Since March lipids, has been taking 1/2 tablet of the 80 mg and will decide on dosage going forward after review of today's results.    Lab Results   Component Value Date    CHOL 83 03/13/2024    CHOL 103 08/30/2023    CHOL 111 01/31/2023     Lab Results   Component Value Date    HDL 31 (L) 03/13/2024    HDL 29 (L) 08/30/2023    HDL 33 (L) 01/31/2023     Lab Results   Component Value Date    LDLCALC 28 03/13/2024    LDLCALC 30 08/30/2023    LDLCALC 42 01/31/2023     Lab Results   Component Value Date    TRIG 122 03/13/2024    TRIG 219 (H) 08/30/2023    TRIG 181 (H) 01/31/2023       Lab Results   Component Value Date    CHOLHDL 2.7 03/13/2024    CHOLHDL 3.6 08/30/2023    CHOLHDL 3.4 01/31/2023         Orders:  -     Comprehensive Metabolic Panel; Future; Expected date: 06/17/2024  -     Lipid Panel; Future; Expected date: 06/17/2024    5. Essential hypertension  Assessment & Plan:  BP well controlled, on Arb for renal protection     Orders:  -     losartan (COZAAR) 25 MG tablet; Take 1 tablet (25 mg total) by mouth once daily.  Dispense: 90 tablet; Refill: 3      Diagnosis, risks, benefits, and side effects of any meds and treatment plan were discussed with the patient.  All questions were answered to the satisfaction of the patient, and pt verbalized understanding and agreement to treatment plan.      Follow up in about 3 months (around 9/17/2024) for anxiety, T2DM, hyperlipidemia, with fasting labs.    Signature:  PROMISE Alonso-BC    Future Appointments   Date Time Provider Department Center   9/19/2024  9:20 AM Carmen Ng FNP [facility]   1/16/2025   8:00 AM AWV NURSE, [facility]IC[facility]

## 2024-06-17 NOTE — ASSESSMENT & PLAN NOTE
Lab Results   Component Value Date    HGBA1C 6.0 03/13/2024     Continue metformin and Ozempic.  Update diabetes urine screening today.

## 2024-06-17 NOTE — PROGRESS NOTES
Population Health Chart Review & Patient Outreach Details    Health Maintenance Topics Addressed and Outreach Outcomes / Actions Taken:  Immunizations [x] VAN sent to [facility].

## 2024-06-17 NOTE — ASSESSMENT & PLAN NOTE
MS  report reviewed via Epic with no suspicious activity noted.  Controlled substance agreement on file.  Random UDS today.  Med refill to pharmacy, change from 3.75 mg to 7.5 mg tab due to taking 2 3.75 mg tabs nightly for many years.

## 2024-06-17 NOTE — LETTER
AUTHORIZATION FOR RELEASE OF   CONFIDENTIAL INFORMATION    Dear [facility],    We are seeing Jesse Winston, date of birth 1955, in the clinic at [facility]. Carmen Ng FNP is the patient's PCP. Jesse Winston has an outstanding lab/procedure at the time we reviewed his chart. In order to help keep his health information updated, he has authorized us to request the following medical record(s):        (  )  MAMMOGRAM                                      (  )  COLONOSCOPY      (  )  PAP SMEAR                                          (  )  OUTSIDE LAB RESULTS     (  )  DEXA SCAN                                          (  )  EYE EXAM            (  )  FOOT EXAM                                          (  )  ENTIRE RECORD     (  )  OUTSIDE IMMUNIZATIONS                 ( x )  Tdap Vaccine         Please fax records to Venkat Davidson LPN Care Coordinator at [phone].      If you have any questions, please contact Venkat at [phone].           Patient Name: Jesse Winston  : 1955  Patient Phone #: [phone]

## 2024-06-17 NOTE — ASSESSMENT & PLAN NOTE
Was advised by his cardiologist that only 40 mg atorvastatin daily needed and 80 mg could be counter productive; however triglycerides and HDL were improved on last labs while taking 80 mg.  Since March lipids, has been taking 1/2 tablet of the 80 mg and will decide on dosage going forward after review of today's results.    Lab Results   Component Value Date    CHOL 83 03/13/2024    CHOL 103 08/30/2023    CHOL 111 01/31/2023     Lab Results   Component Value Date    HDL 31 (L) 03/13/2024    HDL 29 (L) 08/30/2023    HDL 33 (L) 01/31/2023     Lab Results   Component Value Date    LDLCALC 28 03/13/2024    LDLCALC 30 08/30/2023    LDLCALC 42 01/31/2023     Lab Results   Component Value Date    TRIG 122 03/13/2024    TRIG 219 (H) 08/30/2023    TRIG 181 (H) 01/31/2023       Lab Results   Component Value Date    CHOLHDL 2.7 03/13/2024    CHOLHDL 3.6 08/30/2023    CHOLHDL 3.4 01/31/2023

## 2024-07-09 DIAGNOSIS — Z71.89 COMPLEX CARE COORDINATION: ICD-10-CM

## 2024-07-11 ENCOUNTER — PATIENT MESSAGE (OUTPATIENT)
Dept: FAMILY MEDICINE | Facility: CLINIC | Age: 69
End: 2024-07-11
Payer: MEDICARE

## 2024-07-11 DIAGNOSIS — E78.2 MIXED HYPERLIPIDEMIA: Chronic | ICD-10-CM

## 2024-07-11 NOTE — TELEPHONE ENCOUNTER
Hi!     We could honestly go either way.  Your trigs did increase since lowering the atorvastatin to 40 mg, but your HDL is a couple points higher.  I think it is fine to stay at 40 mg.  Do you need me to go ahead and send 40 mg prescription or do you still have some of the 80 mg to take 1/2 tablet?   Send a message and let me know.     Thank you!  Carmen   Written by PROMISE Alonso on 6/17/2024  8:05 PM CDT  Seen by patient Jesse Winston on 7/11/2024  8:18 AM   Kassandra David RN  6/28/2024 11:02 AM CDT Back to Top      Patient notified of lab results.  States he still has some Atorvastatin 80mg left and will call us when he needs a refill.       I received a refill request for atorvastatin 40 mg.  Just to clarify, is he going to stay on the 40 mg?  I thought by the message you sent back to me that I copied above, he plan to stay on 80 mg?  I want to send the correct dosage.

## 2024-07-14 RX ORDER — ATORVASTATIN CALCIUM 40 MG/1
40 TABLET, FILM COATED ORAL DAILY
Qty: 90 TABLET | Refills: 3 | Status: SHIPPED | OUTPATIENT
Start: 2024-07-14

## 2024-08-14 DIAGNOSIS — F41.9 ANXIETY: Chronic | ICD-10-CM

## 2024-08-14 RX ORDER — CLORAZEPATE DIPOTASSIUM 7.5 MG/1
7.5 TABLET ORAL NIGHTLY
Qty: 30 TABLET | Refills: 0 | Status: SHIPPED | OUTPATIENT
Start: 2024-08-14

## 2024-08-14 NOTE — TELEPHONE ENCOUNTER
MS  report reviewed via Epic with no suspicious activity noted.  Last 45 day Tranxene Rx filled 07/01/2024.  Next appointment 08/19/24.

## 2024-08-14 NOTE — TELEPHONE ENCOUNTER
----- Message from Chata Sutton sent at 8/14/2024  3:50 PM CDT -----  TRANXENE sent to [facility]

## 2024-09-09 ENCOUNTER — PATIENT OUTREACH (OUTPATIENT)
Facility: HOSPITAL | Age: 69
End: 2024-09-09
Payer: MEDICARE

## 2024-09-09 NOTE — PROGRESS NOTES
Population Health Chart Review & Patient Outreach Details    Health Maintenance Topics Addressed and Outreach Outcomes / Actions Taken:  Immunization [x] Did not receive vaccines from pharmacy. Second Outreach sent via fax.

## 2024-09-09 NOTE — LETTER
AUTHORIZATION FOR RELEASE OF   CONFIDENTIAL INFORMATION    Dear [facility],    We are seeing Jesse Winston, date of birth 1955, in the clinic at [facility]. Carmen Ng FNP is the patient's PCP. Jesse Winston has an outstanding lab/procedure at the time we reviewed his chart. In order to help keep his health information updated, he has authorized us to request the following medical record(s):        (  )  MAMMOGRAM                                      (  )  COLONOSCOPY      (  )  PAP SMEAR                                          (  )  OUTSIDE LAB RESULTS     (  )  DEXA SCAN                                          (  )  EYE EXAM            (  )  FOOT EXAM                                          (  )  ENTIRE RECORD     ( x )  IMMUNIZATIONS                                 (  )             Please fax records to Venkat Davidson LPN Care Coordinator at [phone].      If you have any questions, please contact Venkat at [phone].           Patient Name: Jesse Winston  : 1955  Patient Phone #: [phone]

## 2024-09-13 ENCOUNTER — TELEPHONE (OUTPATIENT)
Dept: FAMILY MEDICINE | Facility: CLINIC | Age: 69
End: 2024-09-13
Payer: MEDICARE

## 2024-09-13 DIAGNOSIS — F41.9 ANXIETY: Chronic | ICD-10-CM

## 2024-09-13 RX ORDER — CLORAZEPATE DIPOTASSIUM 7.5 MG/1
7.5 TABLET ORAL NIGHTLY
Qty: 30 TABLET | Refills: 1 | Status: SHIPPED | OUTPATIENT
Start: 2024-09-13

## 2024-09-16 DIAGNOSIS — F41.9 ANXIETY: Chronic | ICD-10-CM

## 2024-09-16 RX ORDER — CLORAZEPATE DIPOTASSIUM 7.5 MG/1
7.5 TABLET ORAL NIGHTLY
Qty: 30 TABLET | Refills: 1 | Status: SHIPPED | OUTPATIENT
Start: 2024-09-16

## 2024-09-16 NOTE — TELEPHONE ENCOUNTER
----- Message from Chata Sutton sent at 9/16/2024  9:57 AM CDT -----  TRANXENE sent to [facility]

## 2024-10-31 ENCOUNTER — OFFICE VISIT (OUTPATIENT)
Dept: FAMILY MEDICINE | Facility: CLINIC | Age: 69
End: 2024-10-31
Payer: MEDICARE

## 2024-10-31 VITALS
RESPIRATION RATE: 18 BRPM | BODY MASS INDEX: 31.98 KG/M2 | OXYGEN SATURATION: 95 % | DIASTOLIC BLOOD PRESSURE: 76 MMHG | SYSTOLIC BLOOD PRESSURE: 112 MMHG | TEMPERATURE: 98 F | WEIGHT: 223.38 LBS | HEART RATE: 63 BPM | HEIGHT: 70 IN

## 2024-10-31 DIAGNOSIS — E78.2 MIXED HYPERLIPIDEMIA: Chronic | ICD-10-CM

## 2024-10-31 DIAGNOSIS — E11.9 TYPE 2 DIABETES MELLITUS WITHOUT COMPLICATION, WITHOUT LONG-TERM CURRENT USE OF INSULIN: Primary | Chronic | ICD-10-CM

## 2024-10-31 DIAGNOSIS — F41.9 ANXIETY: Chronic | ICD-10-CM

## 2024-10-31 DIAGNOSIS — Z23 FLU VACCINE NEED: ICD-10-CM

## 2024-10-31 DIAGNOSIS — I10 ESSENTIAL HYPERTENSION: Chronic | ICD-10-CM

## 2024-10-31 DIAGNOSIS — Z79.899 OTHER LONG TERM (CURRENT) DRUG THERAPY: ICD-10-CM

## 2024-10-31 LAB
ALBUMIN SERPL BCP-MCNC: 3.7 G/DL (ref 3.5–5)
ALBUMIN/GLOB SERPL: 1.2 {RATIO}
ALP SERPL-CCNC: 126 U/L (ref 45–115)
ALT SERPL W P-5'-P-CCNC: 50 U/L (ref 16–61)
ANION GAP SERPL CALCULATED.3IONS-SCNC: 7 MMOL/L (ref 7–16)
AST SERPL W P-5'-P-CCNC: 22 U/L (ref 15–37)
BILIRUB SERPL-MCNC: 0.4 MG/DL (ref ?–1.2)
BUN SERPL-MCNC: 15 MG/DL (ref 7–18)
BUN/CREAT SERPL: 14 (ref 6–20)
CALCIUM SERPL-MCNC: 9 MG/DL (ref 8.5–10.1)
CHLORIDE SERPL-SCNC: 107 MMOL/L (ref 98–107)
CHOLEST SERPL-MCNC: 110 MG/DL (ref 0–200)
CHOLEST/HDLC SERPL: 3.1 {RATIO}
CO2 SERPL-SCNC: 29 MMOL/L (ref 21–32)
CREAT SERPL-MCNC: 1.09 MG/DL (ref 0.7–1.3)
CTP QC/QA: YES
EGFR (NO RACE VARIABLE) (RUSH/TITUS): 73 ML/MIN/1.73M2
EST. AVERAGE GLUCOSE BLD GHB EST-MCNC: 128 MG/DL
GLOBULIN SER-MCNC: 3.2 G/DL (ref 2–4)
GLUCOSE SERPL-MCNC: 112 MG/DL (ref 74–106)
HBA1C MFR BLD HPLC: 6.1 % (ref 4.5–6.6)
HDLC SERPL-MCNC: 35 MG/DL (ref 40–60)
LDLC SERPL CALC-MCNC: 46 MG/DL
LDLC/HDLC SERPL: 1.3 {RATIO}
NONHDLC SERPL-MCNC: 75 MG/DL
POC (AMP) AMPHETAMINE: NEGATIVE
POC (BAR) BARBITURATES: NEGATIVE
POC (BUP) BUPRENORPHINE: NEGATIVE
POC (BZO) BENZODIAZEPINES: ABNORMAL
POC (COC) COCAINE: NEGATIVE
POC (MDMA) METHYLENEDIOXYMETHAMPHETAMINE 3,4: NEGATIVE
POC (MET) METHAMPHETAMINE: NEGATIVE
POC (MOP) OPIATES: NEGATIVE
POC (MTD) METHADONE: NEGATIVE
POC (OXY) OXYCODONE: NEGATIVE
POC (PCP) PHENCYCLIDINE: NEGATIVE
POC (TCA) TRICYCLIC ANTIDEPRESSANTS: NEGATIVE
POC TEMPERATURE (URINE): 91
POC THC: NEGATIVE
POTASSIUM SERPL-SCNC: 4.1 MMOL/L (ref 3.5–5.1)
PROT SERPL-MCNC: 6.9 G/DL (ref 6.4–8.2)
SODIUM SERPL-SCNC: 139 MMOL/L (ref 136–145)
TRIGL SERPL-MCNC: 146 MG/DL (ref 35–150)
VLDLC SERPL-MCNC: 29 MG/DL

## 2024-10-31 PROCEDURE — 90653 IIV ADJUVANT VACCINE IM: CPT | Mod: ,,, | Performed by: NURSE PRACTITIONER

## 2024-10-31 PROCEDURE — 80061 LIPID PANEL: CPT | Mod: ,,, | Performed by: CLINICAL MEDICAL LABORATORY

## 2024-10-31 PROCEDURE — 80305 DRUG TEST PRSMV DIR OPT OBS: CPT | Mod: RHCUB | Performed by: NURSE PRACTITIONER

## 2024-10-31 PROCEDURE — 83036 HEMOGLOBIN GLYCOSYLATED A1C: CPT | Mod: ,,, | Performed by: CLINICAL MEDICAL LABORATORY

## 2024-10-31 PROCEDURE — 80053 COMPREHEN METABOLIC PANEL: CPT | Mod: ,,, | Performed by: CLINICAL MEDICAL LABORATORY

## 2024-10-31 PROCEDURE — 99214 OFFICE O/P EST MOD 30 MIN: CPT | Mod: ,,, | Performed by: NURSE PRACTITIONER

## 2024-10-31 PROCEDURE — G0008 ADMIN INFLUENZA VIRUS VAC: HCPCS | Mod: ,,, | Performed by: NURSE PRACTITIONER

## 2024-10-31 RX ORDER — CLORAZEPATE DIPOTASSIUM 7.5 MG/1
7.5 TABLET ORAL NIGHTLY
Qty: 30 TABLET | Refills: 2 | Status: SHIPPED | OUTPATIENT
Start: 2024-11-13

## 2024-10-31 RX ORDER — SEMAGLUTIDE 1.34 MG/ML
1 INJECTION, SOLUTION SUBCUTANEOUS
Qty: 9 ML | Refills: 3 | Status: SHIPPED | OUTPATIENT
Start: 2024-10-31 | End: 2025-10-31

## 2024-10-31 RX ORDER — MAGNESIUM 200 MG
1 TABLET ORAL NIGHTLY PRN
COMMUNITY

## 2025-01-08 LAB
LEFT EYE DM RETINOPATHY: NEGATIVE
RIGHT EYE DM RETINOPATHY: NEGATIVE

## 2025-02-05 ENCOUNTER — PATIENT OUTREACH (OUTPATIENT)
Facility: HOSPITAL | Age: 70
End: 2025-02-05
Payer: MEDICARE

## 2025-02-05 ENCOUNTER — OFFICE VISIT (OUTPATIENT)
Dept: FAMILY MEDICINE | Facility: CLINIC | Age: 70
End: 2025-02-05
Payer: MEDICARE

## 2025-02-05 VITALS
DIASTOLIC BLOOD PRESSURE: 76 MMHG | OXYGEN SATURATION: 96 % | TEMPERATURE: 99 F | RESPIRATION RATE: 20 BRPM | HEIGHT: 70 IN | HEART RATE: 67 BPM | SYSTOLIC BLOOD PRESSURE: 112 MMHG | WEIGHT: 220 LBS | BODY MASS INDEX: 31.5 KG/M2

## 2025-02-05 DIAGNOSIS — F41.9 ANXIETY: Primary | Chronic | ICD-10-CM

## 2025-02-05 DIAGNOSIS — Z79.899 OTHER LONG TERM (CURRENT) DRUG THERAPY: ICD-10-CM

## 2025-02-05 DIAGNOSIS — E11.9 TYPE 2 DIABETES MELLITUS WITHOUT COMPLICATION, WITHOUT LONG-TERM CURRENT USE OF INSULIN: Chronic | ICD-10-CM

## 2025-02-05 LAB
CTP QC/QA: YES
POC (AMP) AMPHETAMINE: NEGATIVE
POC (BAR) BARBITURATES: NEGATIVE
POC (BUP) BUPRENORPHINE: NEGATIVE
POC (BZO) BENZODIAZEPINES: ABNORMAL
POC (COC) COCAINE: NEGATIVE
POC (MDMA) METHYLENEDIOXYMETHAMPHETAMINE 3,4: NEGATIVE
POC (MET) METHAMPHETAMINE: NEGATIVE
POC (MOP) OPIATES: NEGATIVE
POC (MTD) METHADONE: NEGATIVE
POC (OXY) OXYCODONE: NEGATIVE
POC (PCP) PHENCYCLIDINE: NEGATIVE
POC (TCA) TRICYCLIC ANTIDEPRESSANTS: NEGATIVE
POC TEMPERATURE (URINE): 90
POC THC: NEGATIVE

## 2025-02-05 PROCEDURE — 99214 OFFICE O/P EST MOD 30 MIN: CPT | Mod: ,,, | Performed by: NURSE PRACTITIONER

## 2025-02-05 PROCEDURE — 80305 DRUG TEST PRSMV DIR OPT OBS: CPT | Mod: RHCUB | Performed by: NURSE PRACTITIONER

## 2025-02-05 RX ORDER — CLORAZEPATE DIPOTASSIUM 7.5 MG/1
7.5 TABLET ORAL NIGHTLY
Qty: 30 TABLET | Refills: 2 | Status: SHIPPED | OUTPATIENT
Start: 2025-02-05

## 2025-02-05 RX ORDER — SEMAGLUTIDE 2.68 MG/ML
2 INJECTION, SOLUTION SUBCUTANEOUS
Qty: 9 ML | Refills: 3 | Status: SHIPPED | OUTPATIENT
Start: 2025-02-05 | End: 2026-02-05

## 2025-02-05 NOTE — LETTER
AUTHORIZATION FOR RELEASE OF   CONFIDENTIAL INFORMATION    Dear Dr. Loya,    We are seeing Jesse Winston, date of birth 1955, in the clinic at [facility]. Carmen Ng FNP is the patient's PCP. Jesse Winston has an outstanding lab/procedure at the time we reviewed his chart. In order to help keep his health information updated, he has authorized us to request the following medical record(s):        (  )  MAMMOGRAM                                      (  )  COLONOSCOPY      (  )  PAP SMEAR                                          (  )  OUTSIDE LAB RESULTS     (  )  DEXA SCAN                                          ( x )  EYE EXAM            (  )  FOOT EXAM                                          (  )  ENTIRE RECORD     (  )  OUTSIDE IMMUNIZATIONS                 (  )  _______________         Please fax records to Venkat Davidson LPN Care Coordinator at [phone].      If you have any questions, please contact Venkat at [phone].           Patient Name: Jesse Winston  : 1955  Patient Phone #: [phone]

## 2025-02-05 NOTE — ASSESSMENT & PLAN NOTE
MS  report reviewed via Epic with no suspicious activity noted.  Controlled substance agreement on file.  Random UDS today.  Med refills to pharmacy

## 2025-02-05 NOTE — PROGRESS NOTES
Population Health Chart Review & Patient Outreach Details    Health Maintenance Topics Addressed and Outreach Outcomes / Actions Taken:  Diabetic Eye Exam [x] VAN sent to [facility].

## 2025-02-05 NOTE — ASSESSMENT & PLAN NOTE
Lab Results   Component Value Date    HGBA1C 6.1 10/31/2024    HGBA1C 6.0 03/13/2024    HGBA1C 5.8 08/30/2023     Prior to adding Ozempic in May 2023, he was on max dose of metformin with A1c of 6.4% with a persistent fasting glucose in the 130s.  Since adding Ozempic we have achieved much better glucose control with -127 and A1c has been well controlled.  He feels he is no longer as well controlled with Ozempic 1 mg and would like to increase to 2 mg weekly.

## 2025-02-05 NOTE — PROGRESS NOTES
[facility]  [address] [place] [address]  Phone: [phone]  Fax: [phone]       PATIENT NAME: Jesse Winston   : 1955    AGE: 69 y.o. DATE OF ENCOUNTER: 25    MRN: [id number]      PCP: Carmen Ng FNP    Subjective:   CHANGE CHIEF COMPLAINT      :[id number]}274}  Chief Complaint   Patient presents with    Medication Refill    Anxiety    Follow-up     Patient presents to clinic for 3 month follow-up anxiety and med refill.     3-mth f/u anxiety, on long-term benzo, Tranxene  Would like to increase his Ozempic dosage for management of T2DM, no longer suppressing appetite as well.    Review of Systems:     Review of Systems   Constitutional: Negative.    Respiratory: Negative.          On CPAP for SHEILA.   Cardiovascular: Negative.    Gastrointestinal: Negative.    Endocrine: Negative.    Genitourinary: Negative.    Musculoskeletal:  Negative for back pain.   Neurological: Negative.    Psychiatric/Behavioral:  Negative for dysphoric mood, self-injury, sleep disturbance and suicidal ideas. Nervous/anxious: anxiety controlled with tranxene as needed for years.        Allergies and Meds: 274}     Review of patient's allergies indicates:  No Known Allergies     Current Outpatient Medications   Medication Sig Dispense Refill    aspirin (ECOTRIN) 81 MG EC tablet Take 81 mg by mouth once daily.      atorvastatin (LIPITOR) 40 MG tablet Take 1 tablet (40 mg total) by mouth once daily. 90 tablet 3    b complex vitamins capsule Take 1 capsule by mouth once daily.      cetirizine (ZYRTEC) 10 MG tablet Take 10 mg by mouth once daily.      famotidine (PEPCID) 10 MG tablet Take 10 mg by mouth once daily.      folic acid/multivit-min/lutein (CENTRUM SILVER ORAL) Take 1 capsule by mouth once daily.      lactobacillus acidophilus & bulgar (LACTINEX) 100 million cell packet Take 1 tablet by mouth once daily.      losartan (COZAAR) 25 MG tablet Take 1 tablet (25 mg total) by  mouth once daily. 90 tablet 3    magnesium 200 mg Tab Take 1 capsule by mouth nightly as needed.      metFORMIN (GLUCOPHAGE) 500 MG tablet Take 2 tablets (1,000 mg total) by mouth 2 (two) times daily. (Patient taking differently: Take 1,000 mg by mouth once daily.) 360 tablet 3    omega 3-dha-epa-fish oil (FISH OIL) 1,000 mg (120 mg-180 mg) Cap Take 1 capsule by mouth Daily.      tadalafiL (CIALIS) 5 MG tablet Take 1 tablet (5 mg total) by mouth once daily. 90 tablet 3    vitamin D (VITAMIN D3) 1000 units Tab Take 1,000 Units by mouth once daily.      clorazepate (TRANXENE) 7.5 MG Tab Take 1 tablet (7.5 mg total) by mouth nightly. 30 tablet 2    semaglutide (OZEMPIC) 2 mg/dose (8 mg/3 mL) PnIj Inject 2 mg into the skin every 7 days. 9 mL 3     No current facility-administered medications for this visit.       Labs:274}   I have reviewed labs below:    Lab Results   Component Value Date    WBC 6.85 03/13/2024    RBC 4.68 03/13/2024    HGB 14.4 03/13/2024    HCT 41.9 03/13/2024     03/13/2024     10/31/2024    K 4.1 10/31/2024     10/31/2024    CALCIUM 9.0 10/31/2024     (H) 10/31/2024    BUN 15 10/31/2024    CREATININE 1.09 10/31/2024    EGFRNONAA 66 07/13/2022    ALT 50 10/31/2024    AST 22 10/31/2024    CHOL 110 10/31/2024    TRIG 146 10/31/2024    HDL 35 (L) 10/31/2024    LDLCALC 46 10/31/2024    TSH 1.460 03/13/2024    PSA 0.734 03/13/2024    HGBA1C 6.1 10/31/2024    MICROALBUR 0.7 06/17/2024     Medical History: 274}     Past Medical History:   Diagnosis Date    Anxiety     Diabetes mellitus, type 2     Diabetic eye exam 01/03/2024    Dr. Asad Loya MD - [facility]    Hyperlipidemia     Hypertension     Inguinal hernia, left 04/29/2021    Lipoma 10/26/2021    Obesity (BMI 30-39.9)     Personal history of COVID-19 04/29/2021    Umbilical hernia without obstruction and without gangrene 04/29/2021      Social History     Tobacco Use   Smoking Status Never     "Passive exposure: Never   Smokeless Tobacco Never      Past Surgical History:   Procedure Laterality Date    COLONOSCOPY  10/07/2019    HERNIA REPAIR      umbilical & inguinal    LIPOMA RESECTION      removal per Dr. Daniel    REPAIR OF EYELID Bilateral         Health Maintenance:      Health Maintenance Topics with due status: Not Due       Topic Last Completion Date    Colorectal Cancer Screening 10/07/2019    PROSTATE-SPECIFIC ANTIGEN 03/13/2024    Diabetes Urine Screening 06/17/2024    Foot Exam 06/17/2024    Lipid Panel 10/31/2024    Hemoglobin A1c 10/31/2024    Low Dose Statin 02/05/2025       Objective:  274}   Vital Signs  Temp: 98.6 °F (37 °C)  Temp Source: Oral  Pulse: 67  Resp: 20  SpO2: 96 %  BP: 112/76  BP Location: Left arm  Patient Position: Sitting  Pain Score: 0-No pain  Height and Weight  Height: 5' 9.5" (176.5 cm)  Weight: 99.8 kg (220 lb)  BSA (Calculated - sq m): 2.21 sq meters  BMI (Calculated): 32  Weight in (lb) to have BMI = 25: 171.4    Over the last two weeks how often have you been bothered by little interest or pleasure in doing things: 0  Over the last two weeks how often have you been bothered by feeling down, depressed or hopeless: 0  PHQ-2 Total Score: 0    Wt Readings from Last 3 Encounters:   02/05/25 99.8 kg (220 lb)   10/31/24 101.3 kg (223 lb 6.4 oz)   06/17/24 99.7 kg (219 lb 12.8 oz)     Physical Exam  Vitals and nursing note reviewed.   Constitutional:       General: He is not in acute distress.     Appearance: Normal appearance. He is not ill-appearing.   HENT:      Head: Normocephalic.   Eyes:      Conjunctiva/sclera: Conjunctivae normal.   Cardiovascular:      Rate and Rhythm: Normal rate and regular rhythm.      Heart sounds: Normal heart sounds.   Pulmonary:      Effort: Pulmonary effort is normal. No respiratory distress.      Breath sounds: Normal breath sounds.   Skin:     General: Skin is warm and dry.      Coloration: Skin is not jaundiced or pale.   Neurological:    "   Mental Status: He is alert and oriented to person, place, and time.      Gait: Gait normal.   Psychiatric:         Mood and Affect: Mood normal.         Behavior: Behavior normal.         Thought Content: Thought content normal.         Judgment: Judgment normal.          Assessment and Plan: 274}     1. Anxiety  Assessment & Plan:  MS  report reviewed via Epic with no suspicious activity noted.  Controlled substance agreement on file.  Random UDS today.  Med refills to pharmacy    Orders:  -     clorazepate (TRANXENE) 7.5 MG Tab; Take 1 tablet (7.5 mg total) by mouth nightly.  Dispense: 30 tablet; Refill: 2    2. Other long term (current) drug therapy  -     POCT Urine Drug Screen Presump    3. Type 2 diabetes mellitus without complication, without long-term current use of insulin  Assessment & Plan:  Lab Results   Component Value Date    HGBA1C 6.1 10/31/2024    HGBA1C 6.0 03/13/2024    HGBA1C 5.8 08/30/2023     Prior to adding Ozempic in May 2023, he was on max dose of metformin with A1c of 6.4% with a persistent fasting glucose in the 130s.  Since adding Ozempic we have achieved much better glucose control with -127 and A1c has been well controlled.  He feels he is no longer as well controlled with Ozempic 1 mg and would like to increase to 2 mg weekly.    Orders:  -     semaglutide (OZEMPIC) 2 mg/dose (8 mg/3 mL) PnIj; Inject 2 mg into the skin every 7 days.  Dispense: 9 mL; Refill: 3      Diagnosis, risks, benefits, and side effects of any meds and treatment plan were discussed with the patient.  Patient to call or follow-up with any new or worsening symptoms or problems prior to next appointment.  All questions were answered to the satisfaction of the patient, and pt verbalized understanding and agreement to treatment plan.      Follow up in about 3 months (around 5/5/2025) for anxiety, T2DM, with fasting labs.    Signature:  PROMISE Alonso-BC    Future Appointments   Date Time Provider  Department Center   5/8/2025  3:20 PM Carmen Ng FNP [facility]

## 2025-02-14 ENCOUNTER — PATIENT OUTREACH (OUTPATIENT)
Facility: HOSPITAL | Age: 70
End: 2025-02-14
Payer: MEDICARE

## 2025-02-14 NOTE — PROGRESS NOTES
Population Health Chart Review & Patient Outreach Details    Health Maintenance Topics Addressed and Outreach Outcomes / Actions Taken:  Diabetic Eye Exam [x] HM Updated with January 2025 Eye Exam (Dr. Loya). History Updated.

## 2025-05-08 ENCOUNTER — RESULTS FOLLOW-UP (OUTPATIENT)
Dept: FAMILY MEDICINE | Facility: CLINIC | Age: 70
End: 2025-05-08

## 2025-05-08 ENCOUNTER — OFFICE VISIT (OUTPATIENT)
Dept: FAMILY MEDICINE | Facility: CLINIC | Age: 70
End: 2025-05-08
Payer: MEDICARE

## 2025-05-08 VITALS
SYSTOLIC BLOOD PRESSURE: 124 MMHG | HEIGHT: 70 IN | HEART RATE: 72 BPM | WEIGHT: 221.19 LBS | RESPIRATION RATE: 18 BRPM | DIASTOLIC BLOOD PRESSURE: 76 MMHG | TEMPERATURE: 98 F | BODY MASS INDEX: 31.67 KG/M2 | OXYGEN SATURATION: 97 %

## 2025-05-08 DIAGNOSIS — F41.9 ANXIETY: Chronic | ICD-10-CM

## 2025-05-08 DIAGNOSIS — E11.9 TYPE 2 DIABETES MELLITUS WITHOUT COMPLICATION, WITHOUT LONG-TERM CURRENT USE OF INSULIN: Primary | Chronic | ICD-10-CM

## 2025-05-08 DIAGNOSIS — Z12.5 PROSTATE CANCER SCREENING: ICD-10-CM

## 2025-05-08 DIAGNOSIS — Z79.899 OTHER LONG TERM (CURRENT) DRUG THERAPY: ICD-10-CM

## 2025-05-08 LAB
ANION GAP SERPL CALCULATED.3IONS-SCNC: 14 MMOL/L (ref 7–16)
BUN SERPL-MCNC: 11 MG/DL (ref 8–26)
BUN/CREAT SERPL: 9 (ref 6–20)
CALCIUM SERPL-MCNC: 9.6 MG/DL (ref 8.8–10)
CHLORIDE SERPL-SCNC: 106 MMOL/L (ref 98–107)
CO2 SERPL-SCNC: 26 MMOL/L (ref 23–31)
CREAT SERPL-MCNC: 1.24 MG/DL (ref 0.72–1.25)
CREAT UR-MCNC: 101 MG/DL (ref 23–375)
CTP QC/QA: YES
EGFR (NO RACE VARIABLE) (RUSH/TITUS): 62 ML/MIN/1.73M2
EST. AVERAGE GLUCOSE BLD GHB EST-MCNC: 131 MG/DL
GLUCOSE SERPL-MCNC: 110 MG/DL (ref 82–115)
HBA1C MFR BLD HPLC: 6.2 %
MICROALBUMIN UR-MCNC: <0.5 MG/DL
MICROALBUMIN/CREAT RATIO PNL UR: NORMAL
POC (AMP) AMPHETAMINE: NEGATIVE
POC (BAR) BARBITURATES: NEGATIVE
POC (BUP) BUPRENORPHINE: NEGATIVE
POC (BZO) BENZODIAZEPINES: ABNORMAL
POC (COC) COCAINE: NEGATIVE
POC (MDMA) METHYLENEDIOXYMETHAMPHETAMINE 3,4: NEGATIVE
POC (MET) METHAMPHETAMINE: NEGATIVE
POC (MOP) OPIATES: NEGATIVE
POC (MTD) METHADONE: NEGATIVE
POC (OXY) OXYCODONE: NEGATIVE
POC (PCP) PHENCYCLIDINE: NEGATIVE
POC (TCA) TRICYCLIC ANTIDEPRESSANTS: NEGATIVE
POC TEMPERATURE (URINE): 94
POC THC: NEGATIVE
POTASSIUM SERPL-SCNC: 3.6 MMOL/L (ref 3.5–5.1)
PSA SERPL-MCNC: 0.55 NG/ML
SODIUM SERPL-SCNC: 142 MMOL/L (ref 136–145)

## 2025-05-08 PROCEDURE — 99214 OFFICE O/P EST MOD 30 MIN: CPT | Mod: ,,, | Performed by: NURSE PRACTITIONER

## 2025-05-08 PROCEDURE — 80048 BASIC METABOLIC PNL TOTAL CA: CPT | Mod: ,,, | Performed by: CLINICAL MEDICAL LABORATORY

## 2025-05-08 PROCEDURE — 82570 ASSAY OF URINE CREATININE: CPT | Mod: ,,, | Performed by: CLINICAL MEDICAL LABORATORY

## 2025-05-08 PROCEDURE — 80305 DRUG TEST PRSMV DIR OPT OBS: CPT | Mod: RHCUB | Performed by: NURSE PRACTITIONER

## 2025-05-08 PROCEDURE — G0103 PSA SCREENING: HCPCS | Mod: ,,, | Performed by: CLINICAL MEDICAL LABORATORY

## 2025-05-08 PROCEDURE — 83036 HEMOGLOBIN GLYCOSYLATED A1C: CPT | Mod: ,,, | Performed by: CLINICAL MEDICAL LABORATORY

## 2025-05-08 PROCEDURE — 82043 UR ALBUMIN QUANTITATIVE: CPT | Mod: ,,, | Performed by: CLINICAL MEDICAL LABORATORY

## 2025-05-08 RX ORDER — CLORAZEPATE DIPOTASSIUM 7.5 MG/1
7.5 TABLET ORAL NIGHTLY
Qty: 30 TABLET | Refills: 2 | Status: SHIPPED | OUTPATIENT
Start: 2025-05-14

## 2025-05-08 RX ORDER — CLORAZEPATE DIPOTASSIUM 7.5 MG/1
7.5 TABLET ORAL NIGHTLY
Qty: 30 TABLET | Refills: 2 | Status: CANCELLED | OUTPATIENT
Start: 2025-05-08

## 2025-05-08 NOTE — PROGRESS NOTES
[facility]  [address] [place] [address]  Phone: [phone]  Fax: [phone]       PATIENT NAME: Jesse Winston   : 1955    AGE: 70 y.o. DATE OF ENCOUNTER: 25    MRN: [id number]      PCP: Carmen Ng FNP    Subjective:   CHANGE CHIEF COMPLAINT      :[id number]}274}  Chief Complaint   Patient presents with    Hypertension     Patient reports to the clinic today for 3 month follow up.    Hyperlipidemia    Diabetes    Anxiety    Health Maintenance     Care gaps addressed, patient declines all vaccines.    Chyna Coburn CMA     History of Present Illness    HPI:  Patient reports no significant changes in his health status. He has not noticed any difference in his weight despite increasing his Ozempic dose, though his weight is stable at 220 lbs. He does not check his glucose at home. He reports some tingling in his feet, particularly at night after wearing shoes all day. He takes MiraLAX, a stool softener, and a fiber gummy regularly to manage constipation. He will be turning 70 soon, expressing some concern as his father and brother passed away at similar ages.      ROS:  Eyes: +eye strain  Gastrointestinal: -nausea  Neurological: +numbness, +tingling         Allergies and Meds: 274}     Review of patient's allergies indicates:  No Known Allergies     Current Outpatient Medications   Medication Sig Dispense Refill    aspirin (ECOTRIN) 81 MG EC tablet Take 81 mg by mouth once daily.      atorvastatin (LIPITOR) 40 MG tablet Take 1 tablet (40 mg total) by mouth once daily. 90 tablet 3    b complex vitamins capsule Take 1 capsule by mouth once daily.      cetirizine (ZYRTEC) 10 MG tablet Take 10 mg by mouth once daily.      famotidine (PEPCID) 10 MG tablet Take 10 mg by mouth once daily.      folic acid/multivit-min/lutein (CENTRUM SILVER ORAL) Take 1 capsule by mouth once daily.      lactobacillus acidophilus & bulgar (LACTINEX) 100 million cell packet Take 1  tablet by mouth once daily.      losartan (COZAAR) 25 MG tablet Take 1 tablet (25 mg total) by mouth once daily. 90 tablet 3    magnesium 200 mg Tab Take 1 capsule by mouth nightly as needed.      metFORMIN (GLUCOPHAGE) 500 MG tablet Take 2 tablets (1,000 mg total) by mouth once daily. 180 tablet 3    omega 3-dha-epa-fish oil (FISH OIL) 1,000 mg (120 mg-180 mg) Cap Take 1 capsule by mouth Daily.      semaglutide (OZEMPIC) 2 mg/dose (8 mg/3 mL) PnIj Inject 2 mg into the skin every 7 days. 9 mL 3    tadalafiL (CIALIS) 5 MG tablet Take 1 tablet (5 mg total) by mouth once daily. 90 tablet 3    vitamin D (VITAMIN D3) 1000 units Tab Take 1,000 Units by mouth once daily.      [START ON 5/14/2025] clorazepate (TRANXENE) 7.5 MG Tab Take 1 tablet (7.5 mg total) by mouth nightly. 30 tablet 2     No current facility-administered medications for this visit.       Labs:274}   I have reviewed labs below:    Lab Results   Component Value Date    WBC 6.85 03/13/2024    RBC 4.68 03/13/2024    HGB 14.4 03/13/2024    HCT 41.9 03/13/2024     03/13/2024     10/31/2024    K 4.1 10/31/2024     10/31/2024    CALCIUM 9.0 10/31/2024     (H) 10/31/2024    BUN 15 10/31/2024    CREATININE 1.09 10/31/2024    EGFRNONAA 66 07/13/2022    ALT 50 10/31/2024    AST 22 10/31/2024    CHOL 110 10/31/2024    TRIG 146 10/31/2024    HDL 35 (L) 10/31/2024    LDLCALC 46 10/31/2024    TSH 1.460 03/13/2024    PSA 0.734 03/13/2024    HGBA1C 6.1 10/31/2024    MICROALBUR 0.7 06/17/2024       Medical History: 274}     Past Medical History:   Diagnosis Date    Anxiety     Diabetes mellitus, type 2     Diabetic eye exam 01/03/2024    [facility]    Diabetic eye exam 01/08/2025    Dr[facility]    Hyperlipidemia     Hypertension     Inguinal hernia, left 04/29/2021    Lipoma 10/26/2021    Obesity (BMI 30-39.9)     Personal history of COVID-19 04/29/2021    Umbilical  "hernia without obstruction and without gangrene 04/29/2021      Tobacco Use History[1]   Past Surgical History:   Procedure Laterality Date    COLONOSCOPY  10/07/2019    HERNIA REPAIR      umbilical & inguinal    LIPOMA RESECTION      removal per Dr. Daniel    REPAIR OF EYELID Bilateral         Health Maintenance:      Health Maintenance Topics with due status: Not Due       Topic Last Completion Date    Colorectal Cancer Screening 10/07/2019    Diabetes Urine Screening 06/17/2024    Lipid Panel 10/31/2024    Diabetic Eye Exam 01/08/2025    Low Dose Statin 05/08/2025    Foot Exam 05/08/2025       Objective:  274}   Vital Signs  Temp: 97.8 °F (36.6 °C)  Temp Source: Oral  Pulse: 72  Resp: 18  SpO2: 97 %  BP: 124/76  BP Location: Right arm  Patient Position: Sitting  Pain Score: 0-No pain  Height and Weight  Height: 5' 9.5" (176.5 cm)  Weight: 100.3 kg (221 lb 3.2 oz)  BSA (Calculated - sq m): 2.22 sq meters  BMI (Calculated): 32.2  Weight in (lb) to have BMI = 25: 171.4    Over the last two weeks how often have you been bothered by little interest or pleasure in doing things: 0  Over the last two weeks how often have you been bothered by feeling down, depressed or hopeless: 0  PHQ-2 Total Score: 0    Wt Readings from Last 3 Encounters:   05/08/25 100.3 kg (221 lb 3.2 oz)   02/05/25 99.8 kg (220 lb)   10/31/24 101.3 kg (223 lb 6.4 oz)     Physical Exam  Vitals and nursing note reviewed.   Constitutional:       General: He is not in acute distress.     Appearance: Normal appearance.   HENT:      Head: Normocephalic.   Eyes:      Conjunctiva/sclera: Conjunctivae normal.      Pupils: Pupils are equal, round, and reactive to light.   Neck:      Thyroid: No thyromegaly.      Vascular: No carotid bruit.      Trachea: Trachea normal.   Cardiovascular:      Rate and Rhythm: Normal rate and regular rhythm.      Pulses:           Dorsalis pedis pulses are 3+ on the right side and 3+ on the left side.        Posterior tibial " pulses are 3+ on the right side and 3+ on the left side.      Heart sounds: Normal heart sounds.   Pulmonary:      Effort: Pulmonary effort is normal. No respiratory distress.      Breath sounds: Normal breath sounds.   Musculoskeletal:      Cervical back: Neck supple.      Right lower leg: No edema.      Left lower leg: No edema.      Right foot: Normal range of motion. No deformity or bunion.      Left foot: Normal range of motion. No deformity or bunion.   Feet:      Right foot:      Protective Sensation: 6 sites tested.  6 sites sensed.      Skin integrity: Skin integrity normal. No ulcer, blister, erythema, warmth or callus.      Toenail Condition: Right toenails are normal.      Left foot:      Protective Sensation: 6 sites tested.  6 sites sensed.      Skin integrity: Skin integrity normal. No ulcer, blister, erythema, warmth or callus.      Toenail Condition: Left toenails are normal.   Lymphadenopathy:      Cervical: No cervical adenopathy.      Upper Body:      Right upper body: No supraclavicular adenopathy.      Left upper body: No supraclavicular adenopathy.   Skin:     General: Skin is warm and dry.      Findings: No rash.   Neurological:      General: No focal deficit present.      Mental Status: He is alert and oriented to person, place, and time.   Psychiatric:         Mood and Affect: Mood normal.         Behavior: Behavior normal.          Assessment and Plan: 274}       1. Type 2 diabetes mellitus without complication, without long-term current use of insulin  Assessment & Plan:  Lab Results   Component Value Date    HGBA1C 6.1 10/31/2024    HGBA1C 6.0 03/13/2024    HGBA1C 5.8 08/30/2023     Prior to adding Ozempic in May 2023, he was on max dose of metformin with A1c of 6.4% with a persistent fasting glucose in the 130s.  Since adding Ozempic we have achieved much better glucose control with -127 and A1c has been well controlled.    Continue metformin and Ozempic.    Orders:  -      Microalbumin/Creatinine Ratio, Urine; Future; Expected date: 05/08/2025  -     Basic Metabolic Panel; Future; Expected date: 05/08/2025  -     Hemoglobin A1C; Future; Expected date: 05/08/2025    2. Prostate cancer screening  -     PSA, Screening; Future; Expected date: 05/08/2025    3. Other long term (current) drug therapy  -     POCT Urine Drug Screen Presump    4. Anxiety  Assessment & Plan:  MS  report reviewed via Epic with no suspicious activity noted.  Controlled substance agreement on file.  Random UDS today.  Med refills to pharmacy    Orders:  -     POCT Urine Drug Screen Presump  -     clorazepate (TRANXENE) 7.5 MG Tab; Take 1 tablet (7.5 mg total) by mouth nightly.  Dispense: 30 tablet; Refill: 2        Assessment & Plan    IMPRESSION:  - Continued Ozempic 2 mg for diabetes management despite no significant weight loss, as it provides glucose regulation and potential cardiovascular/renal benefits.  - Discussed potential benefits of Ozempic beyond glucose control, including prevention of renal disease progression and heart failure in type 2 diabetes.  - Discussed differences in weight loss outcomes between diabetic and non-diabetic patients on semaglutide.  - Continued Metformin at maximum dose.  - Performed annual foot exam to assess for diabetic neuropathy.    TYPE 2 DIABETES MELLITUS WITH DIABETIC POLYNEUROPATHY:  - A1C levels stable with highest value at 6.4 and fasting glucose in the 130s.  - Annual foot exam revealed patient experiences tingling at night after wearing shoes all day.  - Continuing Ozempic 2 mg despite no significant weight loss, as it provides glucose regulation, potential cardiovascular/renal benefits, and affects GLP-1 hormone levels for appetite suppression.  - Maintaining Metformin at maximum dose.  - Ordered A1C test, metabolic panel, and urine test for microalbumin and creatinine ratio to evaluate renal function.  - Provided instructions on proper foot care to manage  diabetic neuropathy symptoms.    PROSTATE HEALTH MONITORING:  - Ordered PSA test to monitor prostate health.    FOLLOW-UP:  - Follow up in 3 months for anxiety with clorazepate refills.     Signature:  PROMISE Alonso-BC    Future Appointments   Date Time Provider Department Center   8/7/2025 11:20 AM Carmen Ng FNP [facility] [place]       This note was generated with the assistance of ambient listening technology. Verbal consent was obtained by the patient and accompanying visitor(s) for the recording of patient appointment to facilitate this note. I attest to having reviewed and edited the generated note for accuracy, though some syntax or spelling errors may persist. Please contact the author of this note for any clarification.           [1]   Social History  Tobacco Use   Smoking Status Never    Passive exposure: Never   Smokeless Tobacco Never

## 2025-05-08 NOTE — ASSESSMENT & PLAN NOTE
Lab Results   Component Value Date    HGBA1C 6.1 10/31/2024    HGBA1C 6.0 03/13/2024    HGBA1C 5.8 08/30/2023     Prior to adding Ozempic in May 2023, he was on max dose of metformin with A1c of 6.4% with a persistent fasting glucose in the 130s.  Since adding Ozempic we have achieved much better glucose control with -127 and A1c has been well controlled.    Continue metformin and Ozempic.

## 2025-05-11 NOTE — PROGRESS NOTES
Please contact the patient to review results and let them know that their results were okay and do not require any new treatment or change in current treatment plan. Normal PSA.  Glucose is normal.  A1c 6.2% which remains at goal of < 6.5%.

## 2025-08-12 ENCOUNTER — OFFICE VISIT (OUTPATIENT)
Dept: FAMILY MEDICINE | Facility: CLINIC | Age: 70
End: 2025-08-12
Payer: MEDICARE

## 2025-08-12 VITALS
WEIGHT: 222.13 LBS | OXYGEN SATURATION: 95 % | HEART RATE: 64 BPM | DIASTOLIC BLOOD PRESSURE: 66 MMHG | TEMPERATURE: 98 F | RESPIRATION RATE: 20 BRPM | SYSTOLIC BLOOD PRESSURE: 90 MMHG | HEIGHT: 70 IN | BODY MASS INDEX: 31.8 KG/M2

## 2025-08-12 DIAGNOSIS — Z79.899 OTHER LONG TERM (CURRENT) DRUG THERAPY: ICD-10-CM

## 2025-08-12 DIAGNOSIS — Z00.00 ROUTINE GENERAL MEDICAL EXAMINATION AT A HEALTH CARE FACILITY: Primary | ICD-10-CM

## 2025-08-12 DIAGNOSIS — R35.1 BENIGN PROSTATIC HYPERPLASIA WITH NOCTURIA: Chronic | ICD-10-CM

## 2025-08-12 DIAGNOSIS — E11.9 TYPE 2 DIABETES MELLITUS WITHOUT COMPLICATION, WITHOUT LONG-TERM CURRENT USE OF INSULIN: Chronic | ICD-10-CM

## 2025-08-12 DIAGNOSIS — F41.9 ANXIETY: Chronic | ICD-10-CM

## 2025-08-12 DIAGNOSIS — I10 ESSENTIAL HYPERTENSION: Chronic | ICD-10-CM

## 2025-08-12 DIAGNOSIS — N40.1 BENIGN PROSTATIC HYPERPLASIA WITH NOCTURIA: Chronic | ICD-10-CM

## 2025-08-12 LAB
CTP QC/QA: YES
POC (AMP) AMPHETAMINE: NEGATIVE
POC (BAR) BARBITURATES: NEGATIVE
POC (BUP) BUPRENORPHINE: NEGATIVE
POC (BZO) BENZODIAZEPINES: ABNORMAL
POC (COC) COCAINE: NEGATIVE
POC (MDMA) METHYLENEDIOXYMETHAMPHETAMINE 3,4: NEGATIVE
POC (MET) METHAMPHETAMINE: NEGATIVE
POC (MOP) OPIATES: NEGATIVE
POC (MTD) METHADONE: NEGATIVE
POC (OXY) OXYCODONE: NEGATIVE
POC (PCP) PHENCYCLIDINE: NEGATIVE
POC (TCA) TRICYCLIC ANTIDEPRESSANTS: NEGATIVE
POC TEMPERATURE (URINE): 95
POC THC: NEGATIVE

## 2025-08-12 PROCEDURE — 99214 OFFICE O/P EST MOD 30 MIN: CPT | Mod: ,,, | Performed by: NURSE PRACTITIONER

## 2025-08-12 PROCEDURE — 80305 DRUG TEST PRSMV DIR OPT OBS: CPT | Mod: RHCUB | Performed by: NURSE PRACTITIONER

## 2025-08-12 RX ORDER — CLORAZEPATE DIPOTASSIUM 7.5 MG/1
7.5 TABLET ORAL NIGHTLY
Qty: 30 TABLET | Refills: 2 | Status: SHIPPED | OUTPATIENT
Start: 2025-08-12

## 2025-08-12 RX ORDER — TADALAFIL 5 MG/1
5 TABLET ORAL DAILY
Qty: 90 TABLET | Refills: 3 | Status: SHIPPED | OUTPATIENT
Start: 2025-08-12 | End: 2026-08-12